# Patient Record
Sex: FEMALE | NOT HISPANIC OR LATINO | Employment: STUDENT | URBAN - METROPOLITAN AREA
[De-identification: names, ages, dates, MRNs, and addresses within clinical notes are randomized per-mention and may not be internally consistent; named-entity substitution may affect disease eponyms.]

---
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
## 2019-01-28 ENCOUNTER — OFFICE VISIT (OUTPATIENT)
Dept: URGENT CARE | Facility: CLINIC | Age: 19
End: 2019-01-28
Payer: COMMERCIAL

## 2019-01-28 VITALS
OXYGEN SATURATION: 99 % | RESPIRATION RATE: 16 BRPM | TEMPERATURE: 98 F | HEART RATE: 63 BPM | DIASTOLIC BLOOD PRESSURE: 82 MMHG | SYSTOLIC BLOOD PRESSURE: 122 MMHG | WEIGHT: 132.25 LBS | HEIGHT: 68 IN | BODY MASS INDEX: 20.04 KG/M2

## 2019-01-28 DIAGNOSIS — J01.00 ACUTE MAXILLARY SINUSITIS, RECURRENCE NOT SPECIFIED: Primary | ICD-10-CM

## 2019-01-28 PROCEDURE — 99499 NO LOS: ICD-10-PCS | Mod: S$GLB,,, | Performed by: NURSE PRACTITIONER

## 2019-01-28 PROCEDURE — 99499 UNLISTED E&M SERVICE: CPT | Mod: S$GLB,,, | Performed by: NURSE PRACTITIONER

## 2019-01-28 RX ORDER — GUAIFENESIN 600 MG/1
600 TABLET, EXTENDED RELEASE ORAL 2 TIMES DAILY
Qty: 60 TABLET | Refills: 0 | Status: SHIPPED | OUTPATIENT
Start: 2019-01-28 | End: 2019-02-27

## 2019-01-28 RX ORDER — LEVOCETIRIZINE DIHYDROCHLORIDE 5 MG/1
5 TABLET, FILM COATED ORAL DAILY
Qty: 30 TABLET | Refills: 0 | Status: ON HOLD | OUTPATIENT
Start: 2019-01-28 | End: 2019-05-01 | Stop reason: HOSPADM

## 2019-01-28 RX ORDER — FLUTICASONE PROPIONATE 50 MCG
2 SPRAY, SUSPENSION (ML) NASAL DAILY
Qty: 1 BOTTLE | Refills: 0 | Status: ON HOLD | OUTPATIENT
Start: 2019-01-28 | End: 2019-05-01 | Stop reason: HOSPADM

## 2019-01-28 RX ORDER — PROGESTERONE 100 MG/1
100 CAPSULE ORAL DAILY
Status: ON HOLD | COMMUNITY
End: 2019-05-01 | Stop reason: HOSPADM

## 2019-01-28 RX ORDER — PROMETHAZINE HYDROCHLORIDE AND DEXTROMETHORPHAN HYDROBROMIDE 6.25; 15 MG/5ML; MG/5ML
5 SYRUP ORAL NIGHTLY PRN
Qty: 180 ML | Refills: 0 | Status: SHIPPED | OUTPATIENT
Start: 2019-01-28 | End: 2019-02-02

## 2019-01-28 NOTE — PROGRESS NOTES
"Subjective:       Patient ID: Claudette Gilhespie is a 18 y.o. female.    Vitals:    01/28/19 1347   BP: 122/82   Pulse: 63   Resp: 16   Temp: 98 °F (36.7 °C)   TempSrc: Oral   SpO2: 99%   Weight: 60 kg (132 lb 4.4 oz)   Height: 5' 8" (1.727 m)       Chief Complaint: URI    Pt states she has had nasal and chest congestion with a productive cough that has worsened over the last 2 weeks.  Pt has taken aspirin and motrin. Pt states she has right ear pain.  Pt states she has been playing tennis outside for the last few days as well.      URI    This is a new problem. The current episode started 1 to 4 weeks ago. The problem has been gradually worsening. There has been no fever. The fever has been present for less than 1 day. Associated symptoms include congestion, coughing, ear pain, headaches, rhinorrhea, sinus pain and sneezing. Pertinent negatives include no abdominal pain, chest pain, nausea, sore throat or wheezing. She has tried NSAIDs (aspirin) for the symptoms. The treatment provided mild relief.     Review of Systems   Constitution: Negative for chills, fever and malaise/fatigue.   HENT: Positive for congestion, ear pain, rhinorrhea, sinus pain and sneezing. Negative for hoarse voice and sore throat.         Chest and nasal congestion   Eyes: Negative for discharge and redness.   Cardiovascular: Negative for chest pain, dyspnea on exertion and leg swelling.   Respiratory: Positive for cough. Negative for shortness of breath, sputum production and wheezing.    Musculoskeletal: Negative for myalgias.   Gastrointestinal: Negative for abdominal pain and nausea.   Neurological: Positive for headaches.       Objective:      Physical Exam   Constitutional: She is oriented to person, place, and time. She appears well-developed and well-nourished. She is cooperative.  Non-toxic appearance. She does not appear ill. No distress.   HENT:   Head: Normocephalic and atraumatic.   Right Ear: Hearing, tympanic membrane, " external ear and ear canal normal.   Left Ear: Hearing, tympanic membrane, external ear and ear canal normal.   Nose: Rhinorrhea and sinus tenderness present. No mucosal edema or nasal deformity. No epistaxis. Right sinus exhibits maxillary sinus tenderness. Right sinus exhibits no frontal sinus tenderness. Left sinus exhibits maxillary sinus tenderness and frontal sinus tenderness.   Mouth/Throat: Uvula is midline, oropharynx is clear and moist and mucous membranes are normal. No trismus in the jaw. Normal dentition. No uvula swelling. No posterior oropharyngeal erythema.   Eyes: Conjunctivae and lids are normal. Right eye exhibits no discharge. Left eye exhibits no discharge. No scleral icterus.   Sclera clear bilat   Neck: Trachea normal, normal range of motion, full passive range of motion without pain and phonation normal. Neck supple.   Cardiovascular: Normal rate, regular rhythm, normal heart sounds, intact distal pulses and normal pulses.   Pulmonary/Chest: Effort normal and breath sounds normal. No respiratory distress.   Abdominal: Soft. Normal appearance and bowel sounds are normal. She exhibits no distension, no pulsatile midline mass and no mass. There is no tenderness.   Musculoskeletal: Normal range of motion. She exhibits no edema or deformity.   Neurological: She is alert and oriented to person, place, and time. She exhibits normal muscle tone. Coordination normal.   Skin: Skin is warm, dry and intact. She is not diaphoretic. No pallor.   Psychiatric: She has a normal mood and affect. Her speech is normal and behavior is normal. Judgment and thought content normal. Cognition and memory are normal.   Nursing note and vitals reviewed.      Assessment:       1. Acute maxillary sinusitis, recurrence not specified        Plan:       Claudette was seen today for uri.    Diagnoses and all orders for this visit:    Acute maxillary sinusitis, recurrence not specified  -     promethazine-dextromethorphan  (PROMETHAZINE-DM) 6.25-15 mg/5 mL Syrp; Take 5 mLs by mouth nightly as needed.  -     fluticasone (FLONASE) 50 mcg/actuation nasal spray; 2 sprays (100 mcg total) by Each Nare route once daily.  -     guaiFENesin (MUCINEX) 600 mg 12 hr tablet; Take 1 tablet (600 mg total) by mouth 2 (two) times daily.  -     levocetirizine (XYZAL) 5 MG tablet; Take 1 tablet (5 mg total) by mouth once daily.    Please follow up with your Primary care provider within 2-5 days if your signs and symptoms have not resolved or worsen.     If your condition worsens or fails to improve we recommend that you receive another evaluation at the emergency room immediately or contact your primary medical clinic to discuss your concerns.   You must understand that you have received an Urgent Care treatment only and that you may be released before all of your medical problems are known or treated. You, the patient, will arrange for follow up care as instructed.     RED FLAGS/WARNING SYMPTOMS DISCUSSED WITH PATIENT THAT WOULD WARRANT EMERGENT MEDICAL ATTENTION. PATIENT VERBALIZED UNDERSTANDING.       Sinusitis (No Antibiotics)    The sinuses are air-filled spaces within the bones of the face. They connect to the inside of the nose. Sinusitis is an inflammation of the tissue lining the sinus cavity. Sinus inflammation can occur during a cold. It can also be due to allergies to pollens and other particles in the air. It can cause symptoms such as sinus congestion, headache, sore throat, facial swelling and fullness. It may also cause a low-grade fever. No infection is present, and no antibiotic treatment is needed.  Home care  · Drink plenty of water, hot tea, and other liquids. This may help thin mucus. It also may promote sinus drainage.  · Heat may help soothe painful areas of the face. Use a towel soaked in hot water. Or,  the shower and direct the hot spray onto your face. Using a vaporizer along with a menthol rub at night may also  help.   · An expectorant containing guaifenesin may help thin the mucus and promote drainage from the sinuses.  · Over-the-counter decongestants may be used unless a similar medicine was prescribed. Nasal sprays work the fastest. Use one that contains phenylephrine or oxymetazoline. First blow the nose gently. Then use the spray. Do not use these medicines more often than directed on the label or symptoms may get worse. You may also use tablets containing pseudoephedrine. Avoid products that combine ingredients, because side effects may be increased. Read labels. You can also ask the pharmacist for help. (NOTE: Persons with high blood pressure should not use decongestants. They can raise blood pressure.)  · Over-the-counter antihistamines may help if allergies contributed to your sinusitis.    · Use acetaminophen or ibuprofen to control pain, unless another pain medicine was prescribed. (If you have chronic liver or kidney disease or ever had a stomach ulcer, talk with your doctor before using these medicines. Aspirin should never be used in anyone under 18 years of age who is ill with a fever. It may cause severe liver damage.)  · Use nasal rinses or irrigation as instructed by your health care provider.  · Don't smoke. This can worsen symptoms.  Follow-up care  Follow up with your healthcare provider or our staff if you are not improving within the next week.  When to seek medical advice  Call your healthcare provider if any of these occur:  · Green or yellow discharge from the nose or into the throat  · Facial pain or headache becoming more severe  · Stiff neck  · Unusual drowsiness or confusion  · Swelling of the forehead or eyelids  · Vision problems, including blurred or double vision  · Fever of 100.4ºF (38ºC) or higher, or as directed by your healthcare provider  · Seizure  · Breathing problems  · Symptoms not resolving within 10 days  Date Last Reviewed: 4/13/2015  © 7843-3833 [employer] [address]  [place] [address]. All rights reserved. This information is not intended as a substitute for professional medical care. Always follow your healthcare professional's instructions.

## 2019-01-28 NOTE — PATIENT INSTRUCTIONS
Please follow up with your Primary care provider within 2-5 days if your signs and symptoms have not resolved or worsen.     If your condition worsens or fails to improve we recommend that you receive another evaluation at the emergency room immediately or contact your primary medical clinic to discuss your concerns.   You must understand that you have received an Urgent Care treatment only and that you may be released before all of your medical problems are known or treated. You, the patient, will arrange for follow up care as instructed.     RED FLAGS/WARNING SYMPTOMS DISCUSSED WITH PATIENT THAT WOULD WARRANT EMERGENT MEDICAL ATTENTION. PATIENT VERBALIZED UNDERSTANDING.       Sinusitis (No Antibiotics)    The sinuses are air-filled spaces within the bones of the face. They connect to the inside of the nose. Sinusitis is an inflammation of the tissue lining the sinus cavity. Sinus inflammation can occur during a cold. It can also be due to allergies to pollens and other particles in the air. It can cause symptoms such as sinus congestion, headache, sore throat, facial swelling and fullness. It may also cause a low-grade fever. No infection is present, and no antibiotic treatment is needed.  Home care  · Drink plenty of water, hot tea, and other liquids. This may help thin mucus. It also may promote sinus drainage.  · Heat may help soothe painful areas of the face. Use a towel soaked in hot water. Or,  the shower and direct the hot spray onto your face. Using a vaporizer along with a menthol rub at night may also help.   · An expectorant containing guaifenesin may help thin the mucus and promote drainage from the sinuses.  · Over-the-counter decongestants may be used unless a similar medicine was prescribed. Nasal sprays work the fastest. Use one that contains phenylephrine or oxymetazoline. First blow the nose gently. Then use the spray. Do not use these medicines more often than directed on the label or  symptoms may get worse. You may also use tablets containing pseudoephedrine. Avoid products that combine ingredients, because side effects may be increased. Read labels. You can also ask the pharmacist for help. (NOTE: Persons with high blood pressure should not use decongestants. They can raise blood pressure.)  · Over-the-counter antihistamines may help if allergies contributed to your sinusitis.    · Use acetaminophen or ibuprofen to control pain, unless another pain medicine was prescribed. (If you have chronic liver or kidney disease or ever had a stomach ulcer, talk with your doctor before using these medicines. Aspirin should never be used in anyone under 18 years of age who is ill with a fever. It may cause severe liver damage.)  · Use nasal rinses or irrigation as instructed by your health care provider.  · Don't smoke. This can worsen symptoms.  Follow-up care  Follow up with your healthcare provider or our staff if you are not improving within the next week.  When to seek medical advice  Call your healthcare provider if any of these occur:  · Green or yellow discharge from the nose or into the throat  · Facial pain or headache becoming more severe  · Stiff neck  · Unusual drowsiness or confusion  · Swelling of the forehead or eyelids  · Vision problems, including blurred or double vision  · Fever of 100.4ºF (38ºC) or higher, or as directed by your healthcare provider  · Seizure  · Breathing problems  · Symptoms not resolving within 10 days  Date Last Reviewed: 4/13/2015  © 3410-5431 [employer]. [address] [place] [address], [place] [address]. All rights reserved. This information is not intended as a substitute for professional medical care. Always follow your healthcare professional's instructions.

## 2019-02-14 ENCOUNTER — OFFICE VISIT (OUTPATIENT)
Dept: URGENT CARE | Facility: CLINIC | Age: 19
End: 2019-02-14

## 2019-02-14 VITALS
OXYGEN SATURATION: 100 % | HEIGHT: 68 IN | BODY MASS INDEX: 20 KG/M2 | DIASTOLIC BLOOD PRESSURE: 64 MMHG | WEIGHT: 132 LBS | TEMPERATURE: 99 F | RESPIRATION RATE: 16 BRPM | HEART RATE: 86 BPM | SYSTOLIC BLOOD PRESSURE: 124 MMHG

## 2019-02-14 DIAGNOSIS — J10.1 INFLUENZA A: Primary | ICD-10-CM

## 2019-02-14 LAB
CTP QC/QA: YES
FLUAV AG NPH QL: POSITIVE
FLUBV AG NPH QL: NEGATIVE

## 2019-02-14 PROCEDURE — 87804 POCT INFLUENZA A/B: ICD-10-PCS | Mod: QW,S$GLB,, | Performed by: FAMILY MEDICINE

## 2019-02-14 PROCEDURE — 99499 UNLISTED E&M SERVICE: CPT | Mod: S$GLB,,, | Performed by: FAMILY MEDICINE

## 2019-02-14 PROCEDURE — 99499 NO LOS: ICD-10-PCS | Mod: S$GLB,,, | Performed by: FAMILY MEDICINE

## 2019-02-14 PROCEDURE — 87804 INFLUENZA ASSAY W/OPTIC: CPT | Mod: QW,S$GLB,, | Performed by: FAMILY MEDICINE

## 2019-02-14 RX ORDER — OSELTAMIVIR PHOSPHATE 75 MG/1
75 CAPSULE ORAL 2 TIMES DAILY
Qty: 10 CAPSULE | Refills: 0 | Status: SHIPPED | OUTPATIENT
Start: 2019-02-14 | End: 2019-02-19

## 2019-02-14 RX ORDER — PREDNISONE 20 MG/1
20 TABLET ORAL DAILY
Qty: 3 TABLET | Refills: 0 | Status: SHIPPED | OUTPATIENT
Start: 2019-02-14 | End: 2019-02-17

## 2019-02-14 NOTE — PATIENT INSTRUCTIONS
If you were prescribed a narcotic or controlled medication, do not drive or operate heavy equipment or machinery while taking these medications.  You must understand that you've received an Urgent Care treatment only and that you may be released before all your medical problems are known or treated. You, the patient, will arrange for follow up care as instructed.  Follow up with your PCP or specialty clinic as directed in the next 1-2 weeks if not improved or as needed.  You can call [phone] to schedule an appointment with the appropriate provider.  If your condition worsens we recommend that you receive another evaluation at the emergency room immediately or contact your primary medical clinics after hours call service to discuss your concerns.  Please return here or go to the Emergency Department for any concerns or worsening of condition.    Influenza (Adult)    Influenza is also called the flu. It is a viral illness that affects the air passages of your lungs. It is different from the common cold. The flu can easily be passed from one to person to another. It may be spread through the air by coughing and sneezing. Or it can be spread by touching the sick person and then touching your own eyes, nose, or mouth.  The flu starts 1 to 3 days after you are exposed to the flu virus. It may last for 1 to 2 weeks but many people feel tired or fatigued for many weeks afterward. You usually dont need to take antibiotics unless you have a complication. This might be an ear or sinus infection or pneumonia.  Symptoms of the flu may be mild or severe. They can include extreme tiredness (wanting to stay in bed all day), chills, fevers, muscle aches, soreness with eye movement, headache, and a dry, hacking cough.  Home care  Follow these guidelines when caring for yourself at home:  · Avoid being around cigarette smoke, whether yours or other peoples.  · Acetaminophen or ibuprofen will help ease your fever, muscle  aches, and headache. Dont give aspirin to anyone younger than 18 who has the flu. Aspirin can harm the liver.  · Nausea and loss of appetite are common with the flu. Eat light meals. Drink 6 to 8 glasses of liquids every day. Good choices are water, sport drinks, soft drinks without caffeine, juices, tea, and soup. Extra fluids will also help loosen secretions in your nose and lungs.  · Over-the-counter cold medicines will not make the flu go away faster. But the medicines may help with coughing, sore throat, and congestion in your nose and sinuses. Dont use a decongestant if you have high blood pressure.  · Stay home until your fever has been gone for at least 24 hours without using medicine to reduce fever.  Follow-up care  Follow up with your healthcare provider, or as advised, if you are not getting better over the next week.  If you are age 65 or older, talk with your provider about getting a pneumococcal vaccine every 5 years. You should also get this vaccine if you have chronic asthma or COPD. All adults should get a flu vaccine every fall. Ask your provider about this.  When to seek medical advice  Call your healthcare provider right away if any of these occur:  · Cough with lots of colored mucus (sputum) or blood in your mucus  · Chest pain, shortness of breath, wheezing, or trouble breathing  · Severe headache, or face, neck, or ear pain  · New rash with fever  · Fever of 100.4°F (38°C) or higher, or as directed by your healthcare provider  · Confusion, behavior change, or seizure  · Severe weakness or dizziness  · You get a new fever or cough after getting better for a few days  Date Last Reviewed: 1/1/2017  © 2282-8933 [employer]. [address] [place] [address], [place] [address]. All rights reserved. This information is not intended as a substitute for professional medical care. Always follow your healthcare professional's instructions.

## 2019-02-14 NOTE — PROGRESS NOTES
"Subjective:       Patient ID: Claudette Gilhespie is a 18 y.o. female.    Vitals:    02/14/19 0942   BP: 124/64   Pulse: 86   Resp: 16   Temp: 98.9 °F (37.2 °C)   TempSrc: Oral   SpO2: 100%   Weight: 59.9 kg (132 lb)   Height: 5' 8" (1.727 m)       Chief Complaint: URI    Pt c/o having hot and cold sweats, body aches, headache, and a cough. Pt states she's had headaches everyday for a month.  Pt has been taking ibuprofen for the headaches.   Pt states she's had the cough since she came in 2 weeks ago, but did not fill any of the medications.  Pt states it worsened last night and the is when the sweats and body aches started.  Pt took ibuprofen about 45 minutes ago.      Review of Systems   Constitution: Positive for chills and night sweats. Negative for fever and malaise/fatigue.        Body aches   HENT: Negative for congestion, ear pain, hoarse voice and sore throat.    Eyes: Negative for discharge and redness.   Cardiovascular: Negative for chest pain, dyspnea on exertion and leg swelling.   Respiratory: Positive for cough. Negative for shortness of breath, sputum production and wheezing.    Musculoskeletal: Negative for myalgias.   Gastrointestinal: Positive for abdominal pain and nausea.   Neurological: Positive for headaches.       Objective:      Physical Exam   Constitutional: She is oriented to person, place, and time. She appears well-developed and well-nourished. She is cooperative.  Non-toxic appearance. She does not appear ill. No distress.   HENT:   Head: Normocephalic and atraumatic.       Right Ear: Hearing, tympanic membrane, external ear and ear canal normal.   Left Ear: Hearing, tympanic membrane, external ear and ear canal normal.   Nose: No mucosal edema, rhinorrhea or nasal deformity. No epistaxis. Right sinus exhibits maxillary sinus tenderness. Right sinus exhibits no frontal sinus tenderness. Left sinus exhibits maxillary sinus tenderness. Left sinus exhibits no frontal sinus tenderness. "   Mouth/Throat: Uvula is midline and mucous membranes are normal. No trismus in the jaw. Normal dentition. No uvula swelling. Posterior oropharyngeal edema (pnd and cobblestoning) present. No posterior oropharyngeal erythema.   Eyes: Conjunctivae and lids are normal. No scleral icterus.   Sclera clear bilat   Neck: Trachea normal, full passive range of motion without pain and phonation normal. Neck supple.   Cardiovascular: Normal rate, regular rhythm, normal heart sounds, intact distal pulses and normal pulses.   Pulmonary/Chest: Effort normal and breath sounds normal. No respiratory distress.   Abdominal: Soft. Normal appearance and bowel sounds are normal. She exhibits no distension. There is no tenderness.   Musculoskeletal: Normal range of motion. She exhibits no edema or deformity.   Neurological: She is alert and oriented to person, place, and time. She exhibits normal muscle tone. Coordination normal.   Skin: Skin is warm, dry and intact. She is not diaphoretic. No pallor.   Psychiatric: She has a normal mood and affect. Her speech is normal and behavior is normal. Judgment and thought content normal. Cognition and memory are normal.   Nursing note and vitals reviewed.      Assessment:       1. Influenza A        Plan:       Claudette was seen today for uri.    Diagnoses and all orders for this visit:    Influenza A  -     POCT Influenza A/B  -     oseltamivir (TAMIFLU) 75 MG capsule; Take 1 capsule (75 mg total) by mouth 2 (two) times daily. for 5 days  -     predniSONE (DELTASONE) 20 MG tablet; Take 1 tablet (20 mg total) by mouth once daily. for 3 days      Recent Results (from the past 48 hour(s))   POCT Influenza A/B    Collection Time: 02/14/19 10:15 AM   Result Value Ref Range    Rapid Influenza A Ag Positive (A) Negative    Rapid Influenza B Ag Negative Negative     Acceptable Yes    ]    Patient Instructions   If you were prescribed a narcotic or controlled medication, do not drive or  operate heavy equipment or machinery while taking these medications.  You must understand that you've received an Urgent Care treatment only and that you may be released before all your medical problems are known or treated. You, the patient, will arrange for follow up care as instructed.  Follow up with your PCP or specialty clinic as directed in the next 1-2 weeks if not improved or as needed.  You can call [phone] to schedule an appointment with the appropriate provider.  If your condition worsens we recommend that you receive another evaluation at the emergency room immediately or contact your primary medical clinics after hours call service to discuss your concerns.  Please return here or go to the Emergency Department for any concerns or worsening of condition.    Influenza (Adult)    Influenza is also called the flu. It is a viral illness that affects the air passages of your lungs. It is different from the common cold. The flu can easily be passed from one to person to another. It may be spread through the air by coughing and sneezing. Or it can be spread by touching the sick person and then touching your own eyes, nose, or mouth.  The flu starts 1 to 3 days after you are exposed to the flu virus. It may last for 1 to 2 weeks but many people feel tired or fatigued for many weeks afterward. You usually dont need to take antibiotics unless you have a complication. This might be an ear or sinus infection or pneumonia.  Symptoms of the flu may be mild or severe. They can include extreme tiredness (wanting to stay in bed all day), chills, fevers, muscle aches, soreness with eye movement, headache, and a dry, hacking cough.  Home care  Follow these guidelines when caring for yourself at home:  · Avoid being around cigarette smoke, whether yours or other peoples.  · Acetaminophen or ibuprofen will help ease your fever, muscle aches, and headache. Dont give aspirin to anyone younger than 18 who has the  flu. Aspirin can harm the liver.  · Nausea and loss of appetite are common with the flu. Eat light meals. Drink 6 to 8 glasses of liquids every day. Good choices are water, sport drinks, soft drinks without caffeine, juices, tea, and soup. Extra fluids will also help loosen secretions in your nose and lungs.  · Over-the-counter cold medicines will not make the flu go away faster. But the medicines may help with coughing, sore throat, and congestion in your nose and sinuses. Dont use a decongestant if you have high blood pressure.  · Stay home until your fever has been gone for at least 24 hours without using medicine to reduce fever.  Follow-up care  Follow up with your healthcare provider, or as advised, if you are not getting better over the next week.  If you are age 65 or older, talk with your provider about getting a pneumococcal vaccine every 5 years. You should also get this vaccine if you have chronic asthma or COPD. All adults should get a flu vaccine every fall. Ask your provider about this.  When to seek medical advice  Call your healthcare provider right away if any of these occur:  · Cough with lots of colored mucus (sputum) or blood in your mucus  · Chest pain, shortness of breath, wheezing, or trouble breathing  · Severe headache, or face, neck, or ear pain  · New rash with fever  · Fever of 100.4°F (38°C) or higher, or as directed by your healthcare provider  · Confusion, behavior change, or seizure  · Severe weakness or dizziness  · You get a new fever or cough after getting better for a few days  Date Last Reviewed: 1/1/2017  © 4093-2744 [employer]. [address] [place] [address], [place] [address]. All rights reserved. This information is not intended as a substitute for professional medical care. Always follow your healthcare professional's instructions.

## 2019-02-20 ENCOUNTER — TELEPHONE (OUTPATIENT)
Dept: URGENT CARE | Facility: CLINIC | Age: 19
End: 2019-02-20

## 2019-03-12 ENCOUNTER — OFFICE VISIT (OUTPATIENT)
Dept: CARDIOLOGY | Facility: CLINIC | Age: 19
End: 2019-03-12
Payer: COMMERCIAL

## 2019-03-12 ENCOUNTER — DOCUMENTATION ONLY (OUTPATIENT)
Dept: CARDIOLOGY | Facility: CLINIC | Age: 19
End: 2019-03-12

## 2019-03-12 VITALS
WEIGHT: 134.69 LBS | DIASTOLIC BLOOD PRESSURE: 75 MMHG | HEART RATE: 58 BPM | OXYGEN SATURATION: 98 % | SYSTOLIC BLOOD PRESSURE: 122 MMHG | BODY MASS INDEX: 20.41 KG/M2 | HEIGHT: 68 IN

## 2019-03-12 DIAGNOSIS — G43.109 MIGRAINE WITH AURA AND WITHOUT STATUS MIGRAINOSUS, NOT INTRACTABLE: ICD-10-CM

## 2019-03-12 DIAGNOSIS — G45.9 TIA (TRANSIENT ISCHEMIC ATTACK): ICD-10-CM

## 2019-03-12 DIAGNOSIS — Q21.12 PFO (PATENT FORAMEN OVALE): Primary | ICD-10-CM

## 2019-03-12 PROCEDURE — 99999 PR PBB SHADOW E&M-EST. PATIENT-LVL III: ICD-10-PCS | Mod: PBBFAC,,, | Performed by: INTERNAL MEDICINE

## 2019-03-12 PROCEDURE — 99215 OFFICE O/P EST HI 40 MIN: CPT | Mod: S$GLB,,, | Performed by: INTERNAL MEDICINE

## 2019-03-12 PROCEDURE — 99215 PR OFFICE/OUTPT VISIT, EST, LEVL V, 40-54 MIN: ICD-10-PCS | Mod: S$GLB,,, | Performed by: INTERNAL MEDICINE

## 2019-03-12 PROCEDURE — 99999 PR PBB SHADOW E&M-EST. PATIENT-LVL III: CPT | Mod: PBBFAC,,, | Performed by: INTERNAL MEDICINE

## 2019-03-12 RX ORDER — CLOPIDOGREL BISULFATE 75 MG/1
75 TABLET ORAL DAILY
Qty: 30 TABLET | Refills: 6 | Status: SHIPPED | OUTPATIENT
Start: 2019-03-12 | End: 2019-08-12 | Stop reason: SDUPTHER

## 2019-03-12 RX ORDER — SODIUM CHLORIDE 0.9 % (FLUSH) 0.9 %
5 SYRINGE (ML) INJECTION
Status: CANCELLED | OUTPATIENT
Start: 2019-03-12

## 2019-03-12 RX ORDER — SODIUM CHLORIDE 9 MG/ML
INJECTION, SOLUTION INTRAVENOUS ONCE
Status: CANCELLED | OUTPATIENT
Start: 2019-03-12 | End: 2019-03-12

## 2019-03-12 RX ORDER — ASPIRIN 81 MG/1
81 TABLET ORAL DAILY
Refills: 0 | COMMUNITY
Start: 2019-03-12 | End: 2020-03-11

## 2019-03-12 RX ORDER — ASPIRIN 325 MG
TABLET ORAL
Refills: 0 | COMMUNITY
Start: 2019-03-05 | End: 2019-03-12

## 2019-03-12 RX ORDER — SIMVASTATIN 20 MG/1
TABLET, FILM COATED ORAL
Refills: 0 | Status: ON HOLD | COMMUNITY
Start: 2019-03-05 | End: 2019-05-01 | Stop reason: HOSPADM

## 2019-03-12 NOTE — PROGRESS NOTES
You have been scheduled for a procedure in the echo lab on Wednesday March 27, 2019.     Please report to the Cardiology Waiting Area on the Third floor of the hospital and check in at 12 PM.     You will then be taken to the SSCU (Short Stay Cardiac Unit) and prepared for your procedure. Please be aware that this is not the time of your procedure but the time you are to arrive. The procedures are scheduled on an hourly basis; however, emergency cases take precedence over all other cases.     You may not have anything to eat or drink after midnight the night before your test. You may take your regular morning medications with water.    The procedure will take 1-2 hours to perform. After the procedure, you will return to SSCU on the third floor of the hospital. Your family may remain in the room with you during this time.     You will be discharged home that same afternoon. You must have someone to drive you home.  Your doctor will determine, based on your progress, the time of your discharge. The results of your procedure will be discussed with you before you are discharged. Any further testing or procedures will be scheduled for you either before you leave or you will be called with these appointments.     You will be contacted by the echo department prior to your procedure for a  pre-procedure questionnaire.    If you should have any questions, concerns, or need to change the date of your procedure, please call ATUL Baker @ [phone]    Special Instructions:    None        THE ABOVE INSTRUCTIONS WERE GIVEN TO THE PATIENT VERBALLY AND THEY VERBALIZED UNDERSTANDING.  THEY DO NOT REQUIRE ANY SPECIAL NEEDS AND DO NOT HAVE ANY LEARNING BARRIERS.          Directions for Reporting to Cardiology Waiting Area in the Hospital  If you park in the Parking Garage:  Take elevators to the1st floor of the parking garage.  Continue past the gift shop, coffee shop, and piano.  Take a right and go to the gold elevators.  (Elevator B)  Take the elevator to the 3rd floor.  Follow the arrow on the sign on the wall that says Cath Lab Registration/EP Lab Registration.  Follow the long hallway all the way around until you come to a big open area.  This is the registration area.  Check in at Reception Desk.    OR    If family is dropping you off:  Have them drop you off at the front of the Hospital under the green overhang.  Enter through the doors and take a right.  Take the E elevators to the 3rd floor Cardiology Waiting Area.  Check in at the Reception Desk in the waiting room.

## 2019-03-12 NOTE — PATIENT INSTRUCTIONS
Medications:  -Switch to Aspirin 81 mg once daily from 325 mg  -Start Plavix (Clopidogrel) 75 mg once daily

## 2019-03-12 NOTE — PROGRESS NOTES
Interventional Cardiology Clinic Note  Reason for Visit: Systolic Murmur  Referred by:  Dr Monica Martel  We appreciate your kind referral    HPI:   Ms. Claudette Gilhespie is a 18 y.o. woman with PMH sig for migraine headaches, PFO presenting for follow up.    She was previously seen in clinic in August 2018 for a heart murmur. TTE without significant valvular dysfunction, incidentally found small PFO with L to R shunt. Asymptomatic, very active as a college  and doing well at that time, no further intervention recommended at that time.    She presents again now after a TIA last month. While watching a tennis match, she began having R sided weakness lasting about 4 hrs ago as well as word-finding difficulty and slurred speech for ~2 hrs. She was seen in the ED, diagnosed with a TIA. She is feeling back to normal with exception of feeling slightly weaker in the right arm and feels like she's having similar symptoms to post-concussion- delayed recall, mild headaches. Otherwise, doing well. She was started on  and crestor 10. Her estrogen based OCP was discontinued and she was advised to never take OCPs again. Hypercoagauable workup ordered, will need to obtain records. Denies hx of clots. 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 Also flies 8 hr flight from [place], last in January without prior issues.    ROS:    Review of Systems   Constitution: Negative.   HENT: Negative.    Eyes: Negative.    Cardiovascular: Negative.    Respiratory: Negative.    Endocrine: Negative.    Skin: Negative.    Musculoskeletal: Negative.    Gastrointestinal: Negative.    Genitourinary: Negative.    Neurological: Negative.      PMH:     Past Medical History:   Diagnosis Date    Migraine with aura and without status migrainosus, not intractable 9/21/2017    Had to take [place]iz[place] for [place] in the  [place] 6 [place] Normally gets a headache after tennis matches Medrol dose pack helpful    PFO (patent foramen ovale) 3/12/2019    TIA (transient ischemic attack) 3/12/2019     History reviewed. No pertinent surgical history.  Allergies:   Review of patient's allergies indicates:  No Known Allergies  Medications:     Current Outpatient Medications on File Prior to Visit   Medication Sig Dispense Refill    simvastatin (ZOCOR) 20 MG tablet TK 1 T PO HS  0    [DISCONTINUED] aspirin 325 MG tablet TK 1 T PO D  0     No current facility-administered medications on file prior to visit.      Social History:     Social History     Tobacco Use    Smoking status: Never Smoker    Smokeless tobacco: Never Used   Substance Use Topics    Alcohol use: No     Family History:   History reviewed. No pertinent family history.  Physical Exam:   /75 (BP Location: Right arm, Patient Position: Sitting, BP Method: Small (Automatic))   Pulse (!) 58   Ht 5' 8" (1.727 m)   Wt 61.1 kg (134 lb 11.2 oz)   SpO2 98%   BMI 20.48 kg/m²    Physical Exam   Constitutional: She is oriented to person, place, and time. She appears well-developed and well-nourished.   HENT:   Head: Normocephalic and atraumatic.   Eyes: Conjunctivae and EOM are normal. Pupils are equal, round, and reactive to light.   Neck: Normal range of motion. Neck supple. No JVD present.   Cardiovascular: Normal rate and regular rhythm. Exam reveals no gallop and no friction rub.   No murmur heard.  Pulmonary/Chest: Effort normal and breath sounds normal. No respiratory distress. She has no wheezes. She has no rales. She exhibits no tenderness.   Abdominal: Soft. Bowel sounds are normal. She exhibits no distension. There is no tenderness.   Musculoskeletal: Normal range of motion. She exhibits no edema or tenderness.   Neurological: She is alert and oriented to person, place, and time.   Skin: Skin is warm and dry. No erythema. No pallor.   Psychiatric: She has a normal " mood and affect.       Labs:     No results found for: NA, K, CL, CO2, BUN, CREATININE, GLUCOSE, ANIONGAP  No results found for: HGBA1C  No results found for: BNP, BNPTRIAGEBLO No results found for: WBC, HGB, HCT, PLT, GRAN  No results found for: CHOL, HDL, LDLCALC, TRIG       Imaging:   EKG: nsr with sinus arrhythmia, LVH (sokolov-shah), rightward axis  TTE:  CONCLUSIONS     1 - Normal left ventricular systolic function (EF 60-65%).     2 - Normal left ventricular diastolic function.     3 - Normal right ventricular systolic function .     4 - The estimated PA systolic pressure is 25 mmHg.     5 - Trivial pulmonic regurgitation.     6 - Intermediate central venous pressure.     7 - No wall motion abnormalities.     Assessment:     1. PFO (patent foramen ovale)    2. TIA (transient ischemic attack)    3. Migraine with aura and without status migrainosus, not intractable    18F with hx of migraines here for re-evaluation of PFO. Now symptomatic with recent TIA.   Recommend PFO closure.   Will need outpatient monitor to evaluate for subclinical AF, and hypercoaguable w/u.   DVT U/S negative  No OCPs in the future.    Prior Workup:  -DVT U/S negative  -Event monitor pending  -Hypercoaguability pending (may need additional labs)    Plan:   -LEXI  -48 hr Holter  -Switch  to 81 daily  -Start plavix 75 daily  -PFO closure to be scheduled after LEXI  - 6 months DAPT after closure, then TTE with bubble  -Okay to return to competitive activity without limitation. After PFO closure, will stay overnight. Can resume competitive activity after 2-3 days (limited by groin venous access, not by PFO closure).     Discussed with Dr. Wilson    Signed:  Ko Barillas MD  3/12/2019 8:52 AM  I have personally taken the history and examined this patient. I have discussed and agree with the resident's findings and plan as documented in the resident's note.  Chip Wilson

## 2019-03-15 ENCOUNTER — CLINICAL SUPPORT (OUTPATIENT)
Dept: CARDIOLOGY | Facility: HOSPITAL | Age: 19
End: 2019-03-15
Attending: STUDENT IN AN ORGANIZED HEALTH CARE EDUCATION/TRAINING PROGRAM
Payer: COMMERCIAL

## 2019-03-15 DIAGNOSIS — Q21.12 PFO (PATENT FORAMEN OVALE): ICD-10-CM

## 2019-03-15 PROCEDURE — 93227 HOLTER MONITOR - 48 HOUR (CUPID ONLY): ICD-10-PCS | Mod: ,,, | Performed by: INTERNAL MEDICINE

## 2019-03-15 PROCEDURE — 93227 XTRNL ECG REC<48 HR R&I: CPT | Mod: ,,, | Performed by: INTERNAL MEDICINE

## 2019-03-15 PROCEDURE — 93225 XTRNL ECG REC<48 HRS REC: CPT

## 2019-03-18 ENCOUNTER — ANESTHESIA EVENT (OUTPATIENT)
Dept: MEDSURG UNIT | Facility: HOSPITAL | Age: 19
End: 2019-03-18
Payer: COMMERCIAL

## 2019-03-19 ENCOUNTER — ANESTHESIA (OUTPATIENT)
Dept: MEDSURG UNIT | Facility: HOSPITAL | Age: 19
End: 2019-03-19
Payer: COMMERCIAL

## 2019-03-27 ENCOUNTER — HOSPITAL ENCOUNTER (OUTPATIENT)
Dept: CARDIOLOGY | Facility: CLINIC | Age: 19
Discharge: HOME | End: 2019-03-27
Attending: STUDENT IN AN ORGANIZED HEALTH CARE EDUCATION/TRAINING PROGRAM
Payer: COMMERCIAL

## 2019-03-27 ENCOUNTER — HOSPITAL ENCOUNTER (OUTPATIENT)
Facility: HOSPITAL | Age: 19
Discharge: HOME | End: 2019-03-27
Attending: INTERNAL MEDICINE | Admitting: INTERNAL MEDICINE
Payer: COMMERCIAL

## 2019-03-27 VITALS
HEART RATE: 57 BPM | RESPIRATION RATE: 18 BRPM | BODY MASS INDEX: 20.04 KG/M2 | OXYGEN SATURATION: 100 % | WEIGHT: 132.25 LBS | HEIGHT: 68 IN | SYSTOLIC BLOOD PRESSURE: 121 MMHG | DIASTOLIC BLOOD PRESSURE: 59 MMHG | TEMPERATURE: 97 F

## 2019-03-27 DIAGNOSIS — Q21.12 PFO (PATENT FORAMEN OVALE): ICD-10-CM

## 2019-03-27 DIAGNOSIS — Q21.12 PFO (PATENT FORAMEN OVALE): Primary | ICD-10-CM

## 2019-03-27 LAB
B-HCG UR QL: NEGATIVE
BSA FOR ECHO PROCEDURE: 1.7 M2
CTP QC/QA: YES
PROX AORTA: 2.1 CM
SINUS: 3 CM
STJ: 2.4 CM

## 2019-03-27 PROCEDURE — 93320 DOPPLER ECHO COMPLETE: CPT | Mod: 26,S$GLB,, | Performed by: INTERNAL MEDICINE

## 2019-03-27 PROCEDURE — 93320 TRANSESOPHAGEAL ECHO (TEE) (CUPID ONLY): ICD-10-PCS | Mod: 26,S$GLB,, | Performed by: INTERNAL MEDICINE

## 2019-03-27 PROCEDURE — D9220A PRA ANESTHESIA: ICD-10-PCS | Mod: ANES,,, | Performed by: ANESTHESIOLOGY

## 2019-03-27 PROCEDURE — 93325 TRANSESOPHAGEAL ECHO (TEE) (CUPID ONLY): ICD-10-PCS | Mod: 26,S$GLB,, | Performed by: INTERNAL MEDICINE

## 2019-03-27 PROCEDURE — 93320 DOPPLER ECHO COMPLETE: CPT

## 2019-03-27 PROCEDURE — 63600175 PHARM REV CODE 636 W HCPCS: Performed by: NURSE ANESTHETIST, CERTIFIED REGISTERED

## 2019-03-27 PROCEDURE — D9220A PRA ANESTHESIA: Mod: ANES,,, | Performed by: ANESTHESIOLOGY

## 2019-03-27 PROCEDURE — 37000008 HC ANESTHESIA 1ST 15 MINUTES

## 2019-03-27 PROCEDURE — 93312 ECHO TRANSESOPHAGEAL: CPT | Mod: 26,S$GLB,, | Performed by: INTERNAL MEDICINE

## 2019-03-27 PROCEDURE — 93312 ECHO TRANSESOPHAGEAL: CPT

## 2019-03-27 PROCEDURE — 93325 DOPPLER ECHO COLOR FLOW MAPG: CPT

## 2019-03-27 PROCEDURE — 93312 ECHO TRANSESOPHAGEAL: CPT | Performed by: INTERNAL MEDICINE

## 2019-03-27 PROCEDURE — D9220A PRA ANESTHESIA: Mod: CRNA,,, | Performed by: NURSE ANESTHETIST, CERTIFIED REGISTERED

## 2019-03-27 PROCEDURE — 93325 DOPPLER ECHO COLOR FLOW MAPG: CPT | Performed by: INTERNAL MEDICINE

## 2019-03-27 PROCEDURE — 25000003 PHARM REV CODE 250: Performed by: INTERNAL MEDICINE

## 2019-03-27 PROCEDURE — 81025 URINE PREGNANCY TEST: CPT | Performed by: INTERNAL MEDICINE

## 2019-03-27 PROCEDURE — 93325 DOPPLER ECHO COLOR FLOW MAPG: CPT | Mod: 26,S$GLB,, | Performed by: INTERNAL MEDICINE

## 2019-03-27 PROCEDURE — 37000009 HC ANESTHESIA EA ADD 15 MINS

## 2019-03-27 PROCEDURE — D9220A PRA ANESTHESIA: ICD-10-PCS | Mod: CRNA,,, | Performed by: NURSE ANESTHETIST, CERTIFIED REGISTERED

## 2019-03-27 PROCEDURE — 93320 DOPPLER ECHO COMPLETE: CPT | Performed by: INTERNAL MEDICINE

## 2019-03-27 PROCEDURE — 93312 TRANSESOPHAGEAL ECHO (TEE) (CUPID ONLY): ICD-10-PCS | Mod: 26,S$GLB,, | Performed by: INTERNAL MEDICINE

## 2019-03-27 RX ORDER — SODIUM CHLORIDE 9 MG/ML
INJECTION, SOLUTION INTRAVENOUS ONCE
Status: COMPLETED | OUTPATIENT
Start: 2019-03-27 | End: 2019-03-27

## 2019-03-27 RX ORDER — LIDOCAINE HCL/PF 100 MG/5ML
SYRINGE (ML) INTRAVENOUS
Status: DISCONTINUED | OUTPATIENT
Start: 2019-03-27 | End: 2019-03-27

## 2019-03-27 RX ORDER — FENTANYL CITRATE 50 UG/ML
INJECTION, SOLUTION INTRAMUSCULAR; INTRAVENOUS
Status: DISCONTINUED | OUTPATIENT
Start: 2019-03-27 | End: 2019-03-27

## 2019-03-27 RX ORDER — SODIUM CHLORIDE 0.9 % (FLUSH) 0.9 %
3 SYRINGE (ML) INJECTION
Status: DISCONTINUED | OUTPATIENT
Start: 2019-03-27 | End: 2019-03-27 | Stop reason: HOSPADM

## 2019-03-27 RX ORDER — PROPOFOL 10 MG/ML
VIAL (ML) INTRAVENOUS CONTINUOUS PRN
Status: DISCONTINUED | OUTPATIENT
Start: 2019-03-27 | End: 2019-03-27

## 2019-03-27 RX ORDER — PROPOFOL 10 MG/ML
VIAL (ML) INTRAVENOUS
Status: DISCONTINUED | OUTPATIENT
Start: 2019-03-27 | End: 2019-03-27

## 2019-03-27 RX ADMIN — SODIUM CHLORIDE: 0.9 INJECTION, SOLUTION INTRAVENOUS at 01:03

## 2019-03-27 RX ADMIN — PROPOFOL 50 MG: 10 INJECTION, EMULSION INTRAVENOUS at 01:03

## 2019-03-27 RX ADMIN — LIDOCAINE HYDROCHLORIDE 75 MG: 20 INJECTION, SOLUTION INTRAVENOUS at 01:03

## 2019-03-27 RX ADMIN — PROPOFOL 50 MG: 10 INJECTION, EMULSION INTRAVENOUS at 02:03

## 2019-03-27 RX ADMIN — FENTANYL CITRATE 50 MCG: 50 INJECTION, SOLUTION INTRAMUSCULAR; INTRAVENOUS at 02:03

## 2019-03-27 RX ADMIN — PROPOFOL 200 MCG/KG/MIN: 10 INJECTION, EMULSION INTRAVENOUS at 02:03

## 2019-03-27 RX ADMIN — PROPOFOL 150 MCG/KG/MIN: 10 INJECTION, EMULSION INTRAVENOUS at 02:03

## 2019-03-27 RX ADMIN — FENTANYL CITRATE 50 MCG: 50 INJECTION, SOLUTION INTRAMUSCULAR; INTRAVENOUS at 01:03

## 2019-03-27 NOTE — ANESTHESIA PREPROCEDURE EVALUATION
03/27/2019  Claudette Gilhespie is a 19 y.o., female.  Past Medical History:   Diagnosis Date    Migraine with aura and without status migrainosus, not intractable 9/21/2017    Had to take pizot[place] for [place] the [place] 6 [place] Normally gets a headache after tennis matches Medrol dose pack helpful    PFO (patent foramen ovale) 3/12/2019    TIA (transient ischemic attack) 3/12/2019     No past surgical history on file.  Patient Active Problem List   Diagnosis    Concussion without loss of consciousness    Acute post-traumatic headache    Insomnia due to medical condition    Migraine with aura and without status migrainosus, not intractable    Heart murmur    PFO (patent foramen ovale)    TIA (transient ischemic attack)     There is no height or weight on file to calculate BMI.  2D Echo:  Results for orders placed or performed during the hospital encounter of 08/28/18   Echo doppler color flow   Result Value Ref Range    QEF 60 55 - 65    Diastolic Dysfunction No     Est. PA Systolic Pressure 24.65        Please See ROS/PMH and Active Problem List above    Anesthesia Evaluation    I have reviewed the Patient Summary Reports.     I have reviewed the Medications.     Review of Systems  Anesthesia Hx:  Denies Hx of Anesthetic complications  Neg history of prior surgery. Denies Family Hx of Anesthesia complications.   Denies Personal Hx of Anesthesia complications.   Hematology/Oncology:  Hematology Normal   Oncology Normal     EENT/Dental:EENT/Dental Normal   Cardiovascular:  Cardiovascular Normal     Pulmonary:  Pulmonary Normal    Renal/:  Renal/ Normal     Hepatic/GI:  Hepatic/GI Normal    Neurological:   TIA, Headaches    Endocrine:  Endocrine Normal    Dermatological:  Skin Normal    Psych:  Psychiatric Normal           Physical Exam  General:  Well nourished    Airway/Jaw/Neck:  Airway  Findings: Mouth Opening: Normal Tongue: Normal  Mallampati: II  Improves to I with phonation.  TM Distance: Normal, at least 6 cm      Dental:  Dental Findings: In tact   Chest/Lungs:  Chest/Lungs Findings: Clear to auscultation     Heart/Vascular:  Heart Findings: Rate: Normal  Rhythm: Regular Rhythm  Heart Murmur     Abdomen:  Abdomen Findings:  Normal, Nontender, Soft       Mental Status:  Mental Status Findings:  Cooperative, Alert and Oriented         Anesthesia Plan  Type of Anesthesia, risks & benefits discussed:  Anesthesia Type:  general  Patient's Preference:   Intra-op Monitoring Plan: standard ASA monitors  Intra-op Monitoring Plan Comments:   Post Op Pain Control Plan: multimodal analgesia  Post Op Pain Control Plan Comments:   Induction:   IV  Beta Blocker:  Patient is not currently on a Beta-Blocker (No further documentation required).       Informed Consent: Patient understands risks and agrees with Anesthesia plan.  Questions answered. Anesthesia consent signed with patient.  ASA Score: 2     Day of Surgery Review of History & Physical:    H&P update referred to the surgeon.         Ready For Surgery From Anesthesia Perspective.

## 2019-03-27 NOTE — HOSPITAL COURSE
Patient presented for outpatient LEXI . Patient tolerated the procedure well. Please see LEXI report for details. Patient discharged home in a stable condition

## 2019-03-27 NOTE — PLAN OF CARE
Problem: Adult Inpatient Plan of Care  Goal: Plan of Care Review  Outcome: Ongoing (interventions implemented as appropriate)  Received report from ATUL Aguilera. Patient s/p LEXI, AAOx4. VSS, no c/o pain or discomfort at this time, resp even and unlabored. Post procedure protocol reviewed with patient and patient's friend. Understanding verbalized. Friend at bedside.  Nurse call bell within reach. Will continue to monitor per post procedure protocol.

## 2019-03-27 NOTE — ASSESSMENT & PLAN NOTE
PLAN:  1. LEXI for evaluation of PFO    -The risks, benefits & alternatives of the procedure were explained to the patient.    -The risks of transesophageal echo include but are not limited to:  Dental trauma, esophageal trauma/perforation, bleeding, laryngospasm/brochospasm, aspiration, sore throat/hoarseness, & dislodgement of the endotracheal tube/nasogastric tube (where applicable).    -The risks of moderate sedation include hypotension, respiratory depression, arrhythmias, bronchospasm, & death.    -Informed consent was obtained & the patient is agreeable to proceed with the procedure.    I will discuss with the attending physician. Attending addendum is to follow.     Further recommendations per attending addendum

## 2019-03-27 NOTE — DISCHARGE SUMMARY
[facility]  Cardiology  Discharge Summary      Patient Name: Claudette Gilhespie  MRN: [id number]  Admission Date: 3/27/2019  Hospital Length of Stay: 0 days  Discharge Date and Time:  03/27/2019 2:37 PM  Attending Physician: Tee Conteh MD    Discharging Provider: Manolo Berry MD  Primary Care Physician: Baljeet Horvath MD    HPI:   Claudette Gilhespie is a 19 y.o. woman with PMHx of h/o migraine headaches, TIA presents for evaluation for PFO.    Dysphagia or odynophagia:  No  Liver Disease, esophageal disease, or known varices:  No  Upper GI Bleeding: No  Snoring:  No  Sleep Apnea:  No  Prior neck surgery or radiation:  No  History of anesthetic difficulties:  No  Family history of anesthetic difficulties:  No  Last oral intake:  12 hours ago  Able to move neck in all directions:  Yes  Anticoagulation/Antiplatelets:plavix and aspirin      Procedure(s) (LRB):  ECHOCARDIOGRAM,TRANSESOPHAGEAL (N/A)     Indwelling Lines/Drains at time of discharge:  Lines/Drains/Airways          None          Hospital Course:  Patient presented for outpatient LEXI . Patient tolerated the procedure well. Please see LEXI report for details. Patient discharged home in a stable condition    Consults: none    Significant Diagnostic Studies: Labs: BMP: No results for input(s): GLU, NA, K, CL, CO2, BUN, CREATININE, CALCIUM, MG in the last 48 hours., CMP No results for input(s): NA, K, CL, CO2, GLU, BUN, CREATININE, CALCIUM, PROT, ALBUMIN, BILITOT, ALKPHOS, AST, ALT, ANIONGAP, ESTGFRAFRICA, EGFRNONAA in the last 48 hours. and CBC No results for input(s): WBC, HGB, HCT, PLT in the last 48 hours.    Pending Diagnostic Studies:     Procedure Component Value Units Date/Time    Transesophageal echo (LEXI) [[id number]]     Order Status:  Sent Lab Status:  No result           Final Active Diagnoses:    Diagnosis Date Noted POA    PFO (patent foramen ovale) [Q21.1] 03/12/2019 Not Applicable      Problems Resolved During  this Admission:         Discharged Condition: good    Disposition: home    Follow Up:with dr marr     Patient Instructions:   No discharge procedures on file.  Medications:  Reconciled Home Medications:      Medication List      CONTINUE taking these medications    aspirin 81 MG EC tablet  Commonly known as:  ECOTRIN  Take 1 tablet (81 mg total) by mouth once daily.     clopidogrel 75 mg tablet  Commonly known as:  PLAVIX  Take 1 tablet (75 mg total) by mouth once daily.     simvastatin 20 MG tablet  Commonly known as:  ZOCOR  TK 1 T PO HS            Time spent on the discharge of patient: 35 minutes    Manolo Berry MD  Cardiology  [facility]

## 2019-03-27 NOTE — NURSING TRANSFER
Nursing Transfer Note      3/27/2019     Transfer To: sscu    Transfer via stretcher    Transfer with na    Transported by pct    Medicines sent: na    Chart send with patient: Yes    Notified:  on 3rd floor    Patient reassessed at: 1515 3/27/19    Upon arrival to floor: call bell in reach    Report called to sscu nurse.

## 2019-03-27 NOTE — TRANSFER OF CARE
"Anesthesia Transfer of Care Note    Patient: Claudette Gilhespie    Procedure(s) Performed: Procedure(s) (LRB):  ECHOCARDIOGRAM,TRANSESOPHAGEAL (N/A)    Patient location: PACU    Anesthesia Type: general    Transport from OR: Transported from OR on room air with adequate spontaneous ventilation    Post pain: adequate analgesia    Post assessment: no apparent anesthetic complications and tolerated procedure well    Post vital signs: stable    Level of consciousness: sedated and responds to stimulation    Nausea/Vomiting: no nausea/vomiting    Complications: none    Transfer of care protocol was followed      Last vitals:   Visit Vitals  BP (!) 93/51   Pulse (!) 55   Temp 36.5 °C (97.7 °F) (Temporal)   Resp 18   Ht 5' 8" (1.727 m)   Wt 60 kg (132 lb 4.4 oz)   SpO2 99%   Breastfeeding? No   BMI 20.11 kg/m²     "

## 2019-03-27 NOTE — H&P
[facility]  Cardiology  History and Physical     Patient Name: Claudette Gilhespie  MRN: [id number]  Admission Date: 3/27/2019  Code Status: Full Code   Attending Provider: Tee Conteh MD   Primary Care Physician: Baljeet Horvath MD  Principal Problem:<principal problem not specified>    Patient information was obtained from patient and past medical records.     Subjective:     Chief Complaint:  TIA     HPI:  Claudette Gilhespie is a 19 y.o. woman with PMHx of h/o migraine headaches, TIA presents for evaluation for PFO.    Dysphagia or odynophagia:  No  Liver Disease, esophageal disease, or known varices:  No  Upper GI Bleeding: No  Snoring:  No  Sleep Apnea:  No  Prior neck surgery or radiation:  No  History of anesthetic difficulties:  No  Family history of anesthetic difficulties:  No  Last oral intake:  12 hours ago  Able to move neck in all directions:  Yes  Anticoagulation/Antiplatelets:plavix and aspirin      Past Medical History:   Diagnosis Date    Migraine with aura and without status migrainosus, not intractable 9/21/2017    Had to take pizotifen for [place]raines in the [place] for 6 months Normally gets a headache after tennis matches Medrol dose pack helpful    PFO (patent foramen ovale) 3/12/2019    TIA (transient ischemic attack) 3/12/2019       History reviewed. No pertinent surgical history.    Review of patient's allergies indicates:  No Known Allergies    No current facility-administered medications on file prior to encounter.      Current Outpatient Medications on File Prior to Encounter   Medication Sig    aspirin (ECOTRIN) 81 MG EC tablet Take 1 tablet (81 mg total) by mouth once daily.    clopidogrel (PLAVIX) 75 mg tablet Take 1 tablet (75 mg total) by mouth once daily.    simvastatin (ZOCOR) 20 MG tablet TK 1 T PO HS     Family History     None        Tobacco Use    Smoking status: Never Smoker    Smokeless tobacco: Never Used   Substance and Sexual Activity     Alcohol use: No    Drug use: No    Sexual activity: Not on file     Review of Systems   All other systems reviewed and are negative.    Objective:     Vital Signs (Most Recent):  Temp: 98 °F (36.7 °C) (03/27/19 1315)  Pulse: (!) 59 (03/27/19 1315)  Resp: 20 (03/27/19 1315)  BP: (!) 114/56 (03/27/19 1328)  SpO2: 100 % (03/27/19 1315) Vital Signs (24h Range):  Temp:  [98 °F (36.7 °C)] 98 °F (36.7 °C)  Pulse:  [59] 59  Resp:  [20] 20  SpO2:  [100 %] 100 %  BP: (105-114)/(56) 114/56     Weight: 60 kg (132 lb 4.4 oz)  Body mass index is 20.11 kg/m².    SpO2: 100 %  O2 Device (Oxygen Therapy): room air    No intake or output data in the 24 hours ending 03/27/19 1350    Lines/Drains/Airways     Peripheral Intravenous Line                 Peripheral IV - Single Lumen 03/27/19 1327 Left Antecubital less than 1 day                Physical Exam  General: alert, awake and oriented x 3  Eyes:PERRL.   Neck:no JVD   Lungs:  clear to auscultation bilaterally   Cardiovascular: Heart: regular rate and rhythm, S1, S2 normal, no murmur, click, rub or gallop.   Chest Wall: no tenderness.   Pulses-2+ radial, femoral, DP, PT b/l  Extremities: no cyanosis or edema.   Abdomen/Rectal: Abdomen: soft, non-tender non-distented; bowel sounds normal  Neurologic: Normal strength and tone. No focal numbness or weakness  Significant Labs: CMP No results for input(s): NA, K, CL, CO2, GLU, BUN, CREATININE, CALCIUM, PROT, ALBUMIN, BILITOT, ALKPHOS, AST, ALT, ANIONGAP, ESTGFRAFRICA, EGFRNONAA in the last 48 hours., CBC No results for input(s): WBC, HGB, HCT, PLT in the last 48 hours. and INR No results for input(s): INR, PROTIME in the last 48 hours.    Significant Imaging: Echocardiogram:   2D echo with color flow doppler:   Results for orders placed or performed during the hospital encounter of 08/28/18   Echo doppler color flow   Result Value Ref Range    QEF 60 55 - 65    Diastolic Dysfunction No     Est. PA Systolic Pressure 24.65     and  Transthoracic echo (TTE) complete (Cupid Only): No results found for this or any previous visit.    Assessment and Plan:     PFO (patent foramen ovale)  PLAN:  1. LEXI for evaluation of PFO    -The risks, benefits & alternatives of the procedure were explained to the patient.    -The risks of transesophageal echo include but are not limited to:  Dental trauma, esophageal trauma/perforation, bleeding, laryngospasm/brochospasm, aspiration, sore throat/hoarseness, & dislodgement of the endotracheal tube/nasogastric tube (where applicable).    -The risks of moderate sedation include hypotension, respiratory depression, arrhythmias, bronchospasm, & death.    -Informed consent was obtained & the patient is agreeable to proceed with the procedure.    I will discuss with the attending physician. Attending addendum is to follow.     Further recommendations per attending addendum        VTE Risk Mitigation (From admission, onward)    None          Manolo Berry MD  Cardiology   [facility]

## 2019-03-27 NOTE — PLAN OF CARE
Problem: Adult Inpatient Plan of Care  Goal: Plan of Care Review  Outcome: Ongoing (interventions implemented as appropriate)  Patient discharged per MD orders. Instructions given on medications, activity, when to call MD, and follow up appointments. Pt instructed not to drive x 24 hours due to receiving sedation. Pt verbalized understanding. Patient AAOx4, VSS, no c/o pain or discomfort at this time. Telemetry and PIV removed. Patient and friend refused transport, ambulated off unit.

## 2019-03-27 NOTE — HPI
Claudette Gilhespie is a 19 y.o. woman with PMHx of h/o migraine headaches, TIA presents for evaluation for PFO.    Dysphagia or odynophagia:  No  Liver Disease, esophageal disease, or known varices:  No  Upper GI Bleeding: No  Snoring:  No  Sleep Apnea:  No  Prior neck surgery or radiation:  No  History of anesthetic difficulties:  No  Family history of anesthetic difficulties:  No  Last oral intake:  12 hours ago  Able to move neck in all directions:  Yes  Anticoagulation/Antiplatelets:plavix and aspirin

## 2019-03-27 NOTE — SUBJECTIVE & OBJECTIVE
Past Medical History:   Diagnosis Date    Migraine with aura and without status migrainosus, not intractable 9/21/2017    Had to take pizotifen for [place]raines in the [place] for 6 months Normally gets a headache after tennis matches Medrol dose pack helpful    PFO (patent foramen ovale) 3/12/2019    TIA (transient ischemic attack) 3/12/2019       History reviewed. No pertinent surgical history.    Review of patient's allergies indicates:  No Known Allergies    No current facility-administered medications on file prior to encounter.      Current Outpatient Medications on File Prior to Encounter   Medication Sig    aspirin (ECOTRIN) 81 MG EC tablet Take 1 tablet (81 mg total) by mouth once daily.    clopidogrel (PLAVIX) 75 mg tablet Take 1 tablet (75 mg total) by mouth once daily.    simvastatin (ZOCOR) 20 MG tablet TK 1 T PO HS     Family History     None        Tobacco Use    Smoking status: Never Smoker    Smokeless tobacco: Never Used   Substance and Sexual Activity    Alcohol use: No    Drug use: No    Sexual activity: Not on file     Review of Systems   All other systems reviewed and are negative.    Objective:     Vital Signs (Most Recent):  Temp: 98 °F (36.7 °C) (03/27/19 1315)  Pulse: (!) 59 (03/27/19 1315)  Resp: 20 (03/27/19 1315)  BP: (!) 114/56 (03/27/19 1328)  SpO2: 100 % (03/27/19 1315) Vital Signs (24h Range):  Temp:  [98 °F (36.7 °C)] 98 °F (36.7 °C)  Pulse:  [59] 59  Resp:  [20] 20  SpO2:  [100 %] 100 %  BP: (105-114)/(56) 114/56     Weight: 60 kg (132 lb 4.4 oz)  Body mass index is 20.11 kg/m².    SpO2: 100 %  O2 Device (Oxygen Therapy): room air    No intake or output data in the 24 hours ending 03/27/19 1350    Lines/Drains/Airways     Peripheral Intravenous Line                 Peripheral IV - Single Lumen 03/27/19 1327 Left Antecubital less than 1 day                Physical Exam  General: alert, awake and oriented x 3  Eyes:PERRL.   Neck:no JVD   Lungs:  clear to auscultation bilaterally    Cardiovascular: Heart: regular rate and rhythm, S1, S2 normal, no murmur, click, rub or gallop.   Chest Wall: no tenderness.   Pulses-2+ radial, femoral, DP, PT b/l  Extremities: no cyanosis or edema.   Abdomen/Rectal: Abdomen: soft, non-tender non-distented; bowel sounds normal  Neurologic: Normal strength and tone. No focal numbness or weakness  Significant Labs: CMP No results for input(s): NA, K, CL, CO2, GLU, BUN, CREATININE, CALCIUM, PROT, ALBUMIN, BILITOT, ALKPHOS, AST, ALT, ANIONGAP, ESTGFRAFRICA, EGFRNONAA in the last 48 hours., CBC No results for input(s): WBC, HGB, HCT, PLT in the last 48 hours. and INR No results for input(s): INR, PROTIME in the last 48 hours.    Significant Imaging: Echocardiogram:   2D echo with color flow doppler:   Results for orders placed or performed during the hospital encounter of 08/28/18   Echo doppler color flow   Result Value Ref Range    QEF 60 55 - 65    Diastolic Dysfunction No     Est. PA Systolic Pressure 24.65     and Transthoracic echo (TTE) complete (Cupid Only): No results found for this or any previous visit.

## 2019-03-28 ENCOUNTER — TELEPHONE (OUTPATIENT)
Dept: CARDIOLOGY | Facility: CLINIC | Age: 19
End: 2019-03-28

## 2019-03-28 ENCOUNTER — DOCUMENTATION ONLY (OUTPATIENT)
Dept: CARDIOLOGY | Facility: CLINIC | Age: 19
End: 2019-03-28

## 2019-03-28 NOTE — TELEPHONE ENCOUNTER
Called with results of LEXI. Patient scheduled for PFO closure on 4/22/19. Verbalized understanding.

## 2019-03-28 NOTE — PROGRESS NOTES
OUTPATIENT CATHETERIZATION INSTRUCTIONS    You have been scheduled for a procedure in the catheterization lab on Wednesday May 1, 2019.     Please report to the Cardiology Waiting Area on the Third floor of the hospital and check in at 7 AM.   You will then be taken to the SSCU (Short Stay Cardiac Unit) and prepared for your procedure. Please be aware that this is not the time of your procedure but the time you are to arrive. The procedures are scheduled on an hourly basis; however, emergency cases take precedence over all other cases.       You may not have anything to eat or drink after midnight the night before your test. You may take your regular morning medications with water. If there are any medications that you should not take you will be instructed to hold them that morning. If you are diabetic and on Metformin (Glucophage) do not take it the day before, the day of, and for 2 days after your procedure.      The procedure will take 1-2 hours to perform. After the procedure, you will return to SSCU on the third floor of the hospital. You will need to lie still (or keep your arm still) for the next 4 to 6 hours to minimize bleeding from the puncture site. Your family may remain in the room with you during this time.       You may be able to be discharged home that same afternoon if there is someone to drive you home and there were no complications. If you have one of the balloon, stent, or device procedures you may spend the night in the hospital. Your doctor will determine, based on your progress, the date and time of your discharge. The results of your procedure will be discussed with you before you are discharged. Any further testing or procedures will be scheduled for you either before you leave or you will be called with these appointments.       If you should have any questions, concerns, or need to change the date of your procedure, please call ATUL Baker @ [phone]    Special  Instructions:    None        THE ABOVE INSTRUCTIONS WERE GIVEN TO THE PATIENT VERBALLY AND THEY VERBALIZED UNDERSTANDING.  THEY DO NOT REQUIRE ANY SPECIAL NEEDS AND DO NOT HAVE ANY LEARNING BARRIERS.          Directions for Reporting to Cardiology Waiting Area in the Hospital  If you park in the Parking Garage:  Take elevators to the1st floor of the parking garage.  Continue past the gift shop, coffee shop, and piano.  Take a right and go to the gold elevators. (Elevator B)  Take the elevator to the 3rd floor.  Follow the arrow on the sign on the wall that says Cath Lab Registration/EP Lab Registration.  Follow the long hallway all the way around until you come to a big open area.  This is the registration area.  Check in at Reception Desk.    OR    If family is dropping you off:  Have them drop you off at the front of the Hospital under the green overhang.  Enter through the doors and take a right.  Take the E elevators to the 3rd floor Cardiology Waiting Area.  Check in at the Reception Desk in the waiting room.

## 2019-03-29 LAB
OHS CV EVENT MONITOR DAY: 0
OHS CV HOLTER LENGTH DECIMAL HOURS: 48
OHS CV HOLTER LENGTH HOURS: 48
OHS CV HOLTER LENGTH MINUTES: 0

## 2019-04-16 ENCOUNTER — OFFICE VISIT (OUTPATIENT)
Dept: URGENT CARE | Facility: CLINIC | Age: 19
End: 2019-04-16
Payer: COMMERCIAL

## 2019-04-16 VITALS
RESPIRATION RATE: 16 BRPM | HEIGHT: 68 IN | HEART RATE: 58 BPM | WEIGHT: 132.25 LBS | DIASTOLIC BLOOD PRESSURE: 58 MMHG | TEMPERATURE: 98 F | SYSTOLIC BLOOD PRESSURE: 127 MMHG | OXYGEN SATURATION: 100 % | BODY MASS INDEX: 20.04 KG/M2

## 2019-04-16 DIAGNOSIS — L29.9 PRURITUS: Primary | ICD-10-CM

## 2019-04-16 PROCEDURE — 99499 UNLISTED E&M SERVICE: CPT | Mod: S$GLB,,, | Performed by: FAMILY MEDICINE

## 2019-04-16 PROCEDURE — 99499 NO LOS: ICD-10-PCS | Mod: S$GLB,,, | Performed by: FAMILY MEDICINE

## 2019-04-16 RX ORDER — PREDNISONE 20 MG/1
20 TABLET ORAL DAILY
Refills: 0 | COMMUNITY
Start: 2019-02-14 | End: 2019-04-16

## 2019-04-16 RX ORDER — ASPIRIN 325 MG
TABLET ORAL
Refills: 0 | COMMUNITY
Start: 2019-03-05 | End: 2019-04-16

## 2019-04-16 RX ORDER — OSELTAMIVIR PHOSPHATE 75 MG/1
CAPSULE ORAL
Refills: 0 | COMMUNITY
Start: 2019-02-14 | End: 2019-04-16

## 2019-04-16 RX ORDER — CLOPIDOGREL BISULFATE 75 MG/1
TABLET ORAL
Refills: 5 | Status: ON HOLD | COMMUNITY
Start: 2019-04-06 | End: 2019-05-01 | Stop reason: HOSPADM

## 2019-04-16 RX ORDER — SIMVASTATIN 20 MG/1
TABLET, FILM COATED ORAL
Refills: 0 | COMMUNITY
Start: 2019-03-05 | End: 2019-04-16

## 2019-04-16 NOTE — PROGRESS NOTES
"Subjective:       Patient ID: Claudette Gilheopie is a 19 y.o. female.    Vitals:    04/16/19 0912   BP: (!) 127/58   Pulse: (!) 58   Resp: 16   Temp: 97.7 °F (36.5 °C)   TempSrc: Oral   SpO2: 100%   Weight: 60 kg (132 lb 4.4 oz)   Height: 5' 8" (1.727 m)       Chief Complaint: Rash    Pt states she started itching all over a few days ago. Pt states it seems to migrate to different parts of her body.  Pt states she had some bumps on her stomach that went away. Pt has not taken anything for it.  Pt had a stroke last month and is on blood thinners and stopped taking the aspirin a week and a half ago. Pt did switch detergent to tide but thinks she was using tide but a different brand.  Patient is a 19-year-old female here for pruritus that started approximately 3 days ago.  Patient reports that she has itching in her scalp at times, itching on the back of her neck, itching on her abdomen, and itching on her bilateral calves. The itching comes on at different times in different places.  Patient has a history of stroke approximately 6 weeks ago for which she was placed on Plavix aspirin and simvastatin.  Patient is no longer taking the aspirin or simvastatin but continues to take the Plavix.  Patient also reports that she changed laundry detergents a few weeks ago.  Reports seeing a slight rash on the different parts of her body that were itching but only after she was scratching otherwise no rashes present.  Patient denies any known history of liver or gallbladder problems.  Patient denies any signs of jaundice.  Patient has a patent foramen ovale for which will be surgically repaired on May 1st of this year.  Patient is active and a .    Rash   This is a new problem. The current episode started in the past 7 days. The problem has been waxing and waning since onset. The rash is diffuse. The rash is characterized by itchiness. She was exposed to a new detergent/soap. Pertinent negatives include no anorexia, " congestion, fever, joint pain, shortness of breath or sore throat. Past treatments include moisturizer. The treatment provided no relief. There is no history of allergies, asthma or eczema.     Review of Systems   Constitution: Negative for chills and fever.   HENT: Negative for congestion and sore throat.    Respiratory: Negative for shortness of breath.    Skin: Negative for itching and rash.   Musculoskeletal: Negative for joint pain.   Gastrointestinal: Negative for anorexia.       Objective:      Physical Exam   Constitutional: She is oriented to person, place, and time. She appears well-developed and well-nourished.   HENT:   Head: Normocephalic and atraumatic. Head is without abrasion, without contusion and without laceration.   Right Ear: External ear normal.   Left Ear: External ear normal.   Nose: Nose normal.   Mouth/Throat: Oropharynx is clear and moist.   Eyes: Pupils are equal, round, and reactive to light. Conjunctivae, EOM and lids are normal.   Neck: Trachea normal, full passive range of motion without pain and phonation normal. Neck supple.   Cardiovascular: Normal rate and regular rhythm.   Murmur heard.   Systolic murmur is present with a grade of 2/6.  Pulmonary/Chest: Effort normal and breath sounds normal. No stridor. No respiratory distress.   Musculoskeletal: Normal range of motion.   Neurological: She is alert and oriented to person, place, and time.   Skin: Skin is warm, dry and intact. Capillary refill takes less than 2 seconds. No abrasion, no bruising, no burn, no ecchymosis, no laceration, no lesion and no rash noted. No erythema.   Psychiatric: She has a normal mood and affect. Her speech is normal and behavior is normal. Judgment and thought content normal. Cognition and memory are normal.   Nursing note and vitals reviewed.      Assessment:       1. Pruritus        Plan:       Claudette was seen today for rash.    Diagnoses and all orders for this visit:    Pruritus     Patient may  take Zyrtec, Claritin, or Allegra daily for her itching.  Patient should follow up with her cardiologist to discuss side effects of the Plavix.  Patient should change detergent to a hypo allergenic detergent such as tide Free.  If no improvement, patient may return for further workup such as a CMP to look at bilirubin levels.

## 2019-04-16 NOTE — PATIENT INSTRUCTIONS
Take either Zyrtec, claritan, or Allegra daily for itching  Change detergent to tide free  Please follow-up with your cardiologist and other providers

## 2019-05-01 ENCOUNTER — HOSPITAL ENCOUNTER (OUTPATIENT)
Facility: HOSPITAL | Age: 19
Discharge: HOME | DRG: 274 | End: 2019-05-01
Attending: INTERNAL MEDICINE | Admitting: INTERNAL MEDICINE
Payer: COMMERCIAL

## 2019-05-01 VITALS
HEIGHT: 68 IN | WEIGHT: 132.25 LBS | HEART RATE: 65 BPM | RESPIRATION RATE: 18 BRPM | DIASTOLIC BLOOD PRESSURE: 59 MMHG | SYSTOLIC BLOOD PRESSURE: 110 MMHG | BODY MASS INDEX: 20.04 KG/M2 | TEMPERATURE: 97 F | OXYGEN SATURATION: 99 %

## 2019-05-01 DIAGNOSIS — Q21.12 PFO (PATENT FORAMEN OVALE): Primary | ICD-10-CM

## 2019-05-01 LAB
ABO + RH BLD: NORMAL
ANION GAP SERPL CALC-SCNC: 9 MMOL/L (ref 8–16)
B-HCG UR QL: NEGATIVE
BASOPHILS # BLD AUTO: 0.02 K/UL (ref 0–0.2)
BASOPHILS NFR BLD: 0.3 % (ref 0–1.9)
BLD GP AB SCN CELLS X3 SERPL QL: NORMAL
BUN SERPL-MCNC: 13 MG/DL (ref 6–20)
CALCIUM SERPL-MCNC: 9.6 MG/DL (ref 8.7–10.5)
CHLORIDE SERPL-SCNC: 108 MMOL/L (ref 95–110)
CO2 SERPL-SCNC: 22 MMOL/L (ref 23–29)
CREAT SERPL-MCNC: 0.8 MG/DL (ref 0.5–1.4)
CTP QC/QA: YES
DIFFERENTIAL METHOD: NORMAL
EOSINOPHIL # BLD AUTO: 0.1 K/UL (ref 0–0.5)
EOSINOPHIL NFR BLD: 1.4 % (ref 0–8)
ERYTHROCYTE [DISTWIDTH] IN BLOOD BY AUTOMATED COUNT: 12.7 % (ref 11.5–14.5)
EST. GFR  (AFRICAN AMERICAN): >60 ML/MIN/1.73 M^2
EST. GFR  (NON AFRICAN AMERICAN): >60 ML/MIN/1.73 M^2
GLUCOSE SERPL-MCNC: 83 MG/DL (ref 70–110)
HCT VFR BLD AUTO: 38.4 % (ref 37–48.5)
HGB BLD-MCNC: 12.8 G/DL (ref 12–16)
IMM GRANULOCYTES # BLD AUTO: 0.02 K/UL (ref 0–0.04)
IMM GRANULOCYTES NFR BLD AUTO: 0.3 % (ref 0–0.5)
INR PPP: 1.1 (ref 0.8–1.2)
LYMPHOCYTES # BLD AUTO: 1.2 K/UL (ref 1–4.8)
LYMPHOCYTES NFR BLD: 19.6 % (ref 18–48)
MCH RBC QN AUTO: 29.2 PG (ref 27–31)
MCHC RBC AUTO-ENTMCNC: 33.3 G/DL (ref 32–36)
MCV RBC AUTO: 88 FL (ref 82–98)
MONOCYTES # BLD AUTO: 0.6 K/UL (ref 0.3–1)
MONOCYTES NFR BLD: 9 % (ref 4–15)
NEUTROPHILS # BLD AUTO: 4.3 K/UL (ref 1.8–7.7)
NEUTROPHILS NFR BLD: 69.4 % (ref 38–73)
NRBC BLD-RTO: 0 /100 WBC
PLATELET # BLD AUTO: 248 K/UL (ref 150–350)
PMV BLD AUTO: 10.4 FL (ref 9.2–12.9)
POTASSIUM SERPL-SCNC: 4.1 MMOL/L (ref 3.5–5.1)
PROTHROMBIN TIME: 10.9 SEC (ref 9–12.5)
RBC # BLD AUTO: 4.38 M/UL (ref 4–5.4)
SODIUM SERPL-SCNC: 139 MMOL/L (ref 136–145)
WBC # BLD AUTO: 6.22 K/UL (ref 3.9–12.7)

## 2019-05-01 PROCEDURE — 99152 PR MOD CONSCIOUS SEDATION, SAME PHYS, 5+ YRS, FIRST 15 MIN: ICD-10-PCS | Mod: ,,, | Performed by: INTERNAL MEDICINE

## 2019-05-01 PROCEDURE — C1769 GUIDE WIRE: HCPCS | Performed by: INTERNAL MEDICINE

## 2019-05-01 PROCEDURE — 81025 URINE PREGNANCY TEST: CPT | Performed by: INTERNAL MEDICINE

## 2019-05-01 PROCEDURE — C1817 SEPTAL DEFECT IMP SYS: HCPCS | Performed by: INTERNAL MEDICINE

## 2019-05-01 PROCEDURE — 86901 BLOOD TYPING SEROLOGIC RH(D): CPT

## 2019-05-01 PROCEDURE — 99152 MOD SED SAME PHYS/QHP 5/>YRS: CPT | Performed by: INTERNAL MEDICINE

## 2019-05-01 PROCEDURE — 93662 INTRACARDIAC ECG (ICE): CPT | Performed by: INTERNAL MEDICINE

## 2019-05-01 PROCEDURE — 93580 PR PERC CLOS,CONG INTERATRIAL COMMUN W/IMPL: ICD-10-PCS | Mod: ,,, | Performed by: INTERNAL MEDICINE

## 2019-05-01 PROCEDURE — 11000001 HC ACUTE MED/SURG PRIVATE ROOM

## 2019-05-01 PROCEDURE — 85025 COMPLETE CBC W/AUTO DIFF WBC: CPT

## 2019-05-01 PROCEDURE — 80048 BASIC METABOLIC PNL TOTAL CA: CPT

## 2019-05-01 PROCEDURE — 93010 EKG 12-LEAD: ICD-10-PCS | Mod: ,,, | Performed by: INTERNAL MEDICINE

## 2019-05-01 PROCEDURE — 25000003 PHARM REV CODE 250: Performed by: INTERNAL MEDICINE

## 2019-05-01 PROCEDURE — 93005 ELECTROCARDIOGRAM TRACING: CPT

## 2019-05-01 PROCEDURE — 99152 MOD SED SAME PHYS/QHP 5/>YRS: CPT | Mod: ,,, | Performed by: INTERNAL MEDICINE

## 2019-05-01 PROCEDURE — 93580 TRANSCATH CLOSURE OF ASD: CPT | Mod: ,,, | Performed by: INTERNAL MEDICINE

## 2019-05-01 PROCEDURE — 93662 PR INTRACARD ECHO, THER/DX INTERVENT: ICD-10-PCS | Mod: 26,,, | Performed by: INTERNAL MEDICINE

## 2019-05-01 PROCEDURE — 27201423 OPTIME MED/SURG SUP & DEVICES STERILE SUPPLY: Performed by: INTERNAL MEDICINE

## 2019-05-01 PROCEDURE — 25000003 PHARM REV CODE 250: Performed by: STUDENT IN AN ORGANIZED HEALTH CARE EDUCATION/TRAINING PROGRAM

## 2019-05-01 PROCEDURE — 85610 PROTHROMBIN TIME: CPT

## 2019-05-01 PROCEDURE — C1753 CATH, INTRAVAS ULTRASOUND: HCPCS | Performed by: INTERNAL MEDICINE

## 2019-05-01 PROCEDURE — 93662 INTRACARDIAC ECG (ICE): CPT | Mod: 26,,, | Performed by: INTERNAL MEDICINE

## 2019-05-01 PROCEDURE — 99153 MOD SED SAME PHYS/QHP EA: CPT | Performed by: INTERNAL MEDICINE

## 2019-05-01 PROCEDURE — 93010 ELECTROCARDIOGRAM REPORT: CPT | Mod: ,,, | Performed by: INTERNAL MEDICINE

## 2019-05-01 PROCEDURE — 63600175 PHARM REV CODE 636 W HCPCS: Performed by: INTERNAL MEDICINE

## 2019-05-01 PROCEDURE — 93580 TRANSCATH CLOSURE OF ASD: CPT | Performed by: INTERNAL MEDICINE

## 2019-05-01 PROCEDURE — C1894 INTRO/SHEATH, NON-LASER: HCPCS | Performed by: INTERNAL MEDICINE

## 2019-05-01 PROCEDURE — A4216 STERILE WATER/SALINE, 10 ML: HCPCS | Performed by: STUDENT IN AN ORGANIZED HEALTH CARE EDUCATION/TRAINING PROGRAM

## 2019-05-01 DEVICE — IMPLANTABLE DEVICE
Type: IMPLANTABLE DEVICE | Site: HEART | Status: FUNCTIONAL
Brand: AMPLATZER™

## 2019-05-01 RX ORDER — SODIUM CHLORIDE 9 MG/ML
INJECTION, SOLUTION INTRAVENOUS ONCE
Status: COMPLETED | OUTPATIENT
Start: 2019-05-01 | End: 2019-05-01

## 2019-05-01 RX ORDER — HEPARIN SODIUM 1000 [USP'U]/ML
INJECTION, SOLUTION INTRAVENOUS; SUBCUTANEOUS
Status: DISCONTINUED | OUTPATIENT
Start: 2019-05-01 | End: 2019-05-01 | Stop reason: HOSPADM

## 2019-05-01 RX ORDER — CEFAZOLIN SODIUM 1 G/3ML
INJECTION, POWDER, FOR SOLUTION INTRAMUSCULAR; INTRAVENOUS
Status: DISCONTINUED | OUTPATIENT
Start: 2019-05-01 | End: 2019-05-01 | Stop reason: HOSPADM

## 2019-05-01 RX ORDER — ACETAMINOPHEN 325 MG/1
650 TABLET ORAL EVERY 4 HOURS PRN
Status: DISCONTINUED | OUTPATIENT
Start: 2019-05-01 | End: 2019-05-01 | Stop reason: HOSPADM

## 2019-05-01 RX ORDER — SODIUM CHLORIDE 9 MG/ML
INJECTION, SOLUTION INTRAVENOUS CONTINUOUS
Status: ACTIVE | OUTPATIENT
Start: 2019-05-01 | End: 2019-05-01

## 2019-05-01 RX ORDER — FENTANYL CITRATE 50 UG/ML
INJECTION, SOLUTION INTRAMUSCULAR; INTRAVENOUS
Status: DISCONTINUED | OUTPATIENT
Start: 2019-05-01 | End: 2019-05-01 | Stop reason: HOSPADM

## 2019-05-01 RX ORDER — SODIUM CHLORIDE 0.9 % (FLUSH) 0.9 %
5 SYRINGE (ML) INJECTION
Status: DISCONTINUED | OUTPATIENT
Start: 2019-05-01 | End: 2019-05-01 | Stop reason: HOSPADM

## 2019-05-01 RX ORDER — MIDAZOLAM HYDROCHLORIDE 1 MG/ML
INJECTION, SOLUTION INTRAMUSCULAR; INTRAVENOUS
Status: DISCONTINUED | OUTPATIENT
Start: 2019-05-01 | End: 2019-05-01 | Stop reason: HOSPADM

## 2019-05-01 RX ORDER — LIDOCAINE HYDROCHLORIDE 20 MG/ML
INJECTION, SOLUTION INFILTRATION; PERINEURAL
Status: DISCONTINUED | OUTPATIENT
Start: 2019-05-01 | End: 2019-05-01 | Stop reason: HOSPADM

## 2019-05-01 RX ORDER — PROTAMINE SULFATE 10 MG/ML
INJECTION, SOLUTION INTRAVENOUS
Status: DISCONTINUED | OUTPATIENT
Start: 2019-05-01 | End: 2019-05-01 | Stop reason: HOSPADM

## 2019-05-01 RX ORDER — HEPARIN SODIUM 200 [USP'U]/100ML
INJECTION, SOLUTION INTRAVENOUS
Status: DISCONTINUED | OUTPATIENT
Start: 2019-05-01 | End: 2019-05-01 | Stop reason: HOSPADM

## 2019-05-01 RX ADMIN — ACETAMINOPHEN 650 MG: 325 TABLET ORAL at 12:05

## 2019-05-01 NOTE — SUBJECTIVE & OBJECTIVE
Past Medical History:   Diagnosis Date    Migraine with aura and without status migrainosus, not intractable 9/21/2017    Had to take pizotifen for [place]ines in the [place] for [place] Normally gets a headache after tennis matches Medrol dose pack helpful    PFO (patent foramen ovale) 3/12/2019    Stroke     TIA (transient ischemic attack) 3/12/2019     Past Surgical History:   Procedure Laterality Date    ECHOCARDIOGRAM,TRANSESOPHAGEAL N/A 3/27/2019    Performed by [facility] Provider at [facility]     Review of patient's allergies indicates:  No Known Allergies    PTA Medications   Medication Sig    aspirin (ECOTRIN) 81 MG EC tablet Take 1 tablet (81 mg total) by mouth once daily.    clopidogrel (PLAVIX) 75 mg tablet Take 1 tablet (75 mg total) by mouth once daily.    clopidogrel (PLAVIX) 75 mg tablet TK 1 T PO  QD    fluticasone (FLONASE) 50 mcg/actuation nasal spray 2 sprays (100 mcg total) by Each Nare route once daily.    levocetirizine (XYZAL) 5 MG tablet Take 1 tablet (5 mg total) by mouth once daily.    progesterone (PROMETRIUM) 100 MG capsule Take 100 mg by mouth once daily.    simvastatin (ZOCOR) 20 MG tablet TK 1 T PO HS     Family History     Problem Relation (Age of Onset)    No Known Problems Mother, Father        Tobacco Use    Smoking status: Never Smoker    Smokeless tobacco: Never Used   Substance and Sexual Activity    Alcohol use: No    Drug use: No    Sexual activity: Not on file     Review of Systems   Constitution: Negative for chills, fever and weight loss.   HENT: Negative for sore throat and stridor.    Eyes: Negative for blurred vision, double vision and pain.   Cardiovascular: Negative for chest pain, claudication, dyspnea on exertion, near-syncope, orthopnea, palpitations, paroxysmal nocturnal dyspnea and syncope.   Respiratory: Negative for cough, shortness of breath, sputum production and wheezing.    Endocrine: Negative for polydipsia, polyphagia and polyuria.    Skin: Negative for rash.   Musculoskeletal: Negative for joint pain, joint swelling and myalgias.   Gastrointestinal: Negative for abdominal pain, constipation, diarrhea, heartburn, melena, nausea and vomiting.   Genitourinary: Negative for dysuria and hematuria.   Neurological: Negative for focal weakness and loss of balance.   Psychiatric/Behavioral: Negative for depression and memory loss.     Objective:     Vital Signs (Most Recent):  Temp: 96.2 °F (35.7 °C) (05/01/19 0749)  Pulse: (!) 53 (05/01/19 0749)  Resp: 18 (05/01/19 0749)  BP: (!) 109/57 (05/01/19 0753)  SpO2: 100 % (05/01/19 0749) Vital Signs (24h Range):  Temp:  [96.2 °F (35.7 °C)] 96.2 °F (35.7 °C)  Pulse:  [53] 53  Resp:  [18] 18  SpO2:  [100 %] 100 %  BP: (109)/(57-64) 109/57     Weight: 60 kg (132 lb 4.4 oz)  Body mass index is 20.11 kg/m².  SpO2: 100 %  O2 Device (Oxygen Therapy): room air    No intake or output data in the 24 hours ending 05/01/19 0831    Lines/Drains/Airways     Peripheral Intravenous Line                 Peripheral IV - Single Lumen 05/01/19 0818 20 G Right Antecubital less than 1 day              Physical Exam   Constitutional: She is oriented to person, place, and time. She appears well-developed and well-nourished.   HENT:   Head: Normocephalic and atraumatic.   Eyes: Pupils are equal, round, and reactive to light.   Neck: Normal range of motion. No JVD present.   Cardiovascular: Normal rate and regular rhythm.   No murmur heard.  Pulmonary/Chest: Effort normal and breath sounds normal. She has no wheezes. She has no rales.   Abdominal: Soft. Bowel sounds are normal. She exhibits no distension. There is no tenderness.   Neurological: She is alert and oriented to person, place, and time.   Skin: Skin is warm and dry.

## 2019-05-01 NOTE — HPI
Ms. Claudette Gilhespie is a 19 y.o. woman with PMHx of PFO, CVA/TIA in 2/2019, and migraine headaches.    She was first seen in clinic in August 2018 for a heart murmur. TTE without significant valvular dysfunction, incidentally found small PFO with L to R shunt. Asymptomatic, very active as a college  and doing well at that time, no further intervention recommended at that time.     She presented to clinic again in 3/2019 after a CVA/TIA in February. While watching a tennis match, she began having R sided weakness lasting about 4 hrs ago as well as word-finding difficulty and slurred speech for ~2 hrs. She was seen in the ED, diagnosed with a TIA. She is feeling back to normal with exception of feeling slightly weaker in the right arm and feels like she's having similar symptoms to post-concussion- delayed recall, mild headaches. Otherwise, doing well. She was started on  and crestor 10. Her estrogen based OCP was discontinued and she was advised to never take OCPs again. Hypercoagauable workup ordered, results pending. Denies hx of clots. 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 Also flies 8 hr flight from [place], last in January without prior issues.    LEXI in 4/2019 confirmed PFO with R > L shunt.    She now presents for PFO closure. Feeling well, no new complaints, no CVA/TIA symptoms since her last clinic visit.  NPO since yesterday.  Has been on Plavix, but stopped taking ASA about 3 weeks ago.  Labs drawn this AM, in process.

## 2019-05-01 NOTE — H&P
Interventional Cardiology H & PNote  Attending Physician: Chip Wilson MD      HPI:   Claudette Gilhespie is a 19 y.o. woman with PMHx of h/o migraine headaches, TIA presents for PFO closure.    She was previously seen in clinic in August 2018 for a heart murmur. TTE without significant valvular dysfunction, incidentally found small PFO with L to R shunt. Asymptomatic, very active as a college  and doing well at that time, no further intervention recommended at that time.     She presents again now after a TIA last month. While watching a tennis match, she began having R sided weakness lasting about 4 hrs ago as well as word-finding difficulty and slurred speech for ~2 hrs. She was seen in the ED, diagnosed with a TIA. She is feeling back to normal with exception of feeling slightly weaker in the right arm and feels like she's having similar symptoms to post-concussion- delayed recall, mild headaches. Otherwise, doing well. She was started on  and crestor 10. Her estrogen based OCP was discontinued and she was advised to never take OCPs again. Hypercoagauable workup ordered, will need to obtain records. Denies hx of clots. 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 Also flies 8 hr flight from [place], last in January without prior issues.        ROS:    Constitution: negative for - fever, chills, weight loss or weight gain  HENT: negative for - sore throat, rhinorrhea, or headache  Eyes: negative for - blurred or double vision  Cardiovascular: no chest pain or dyspnea on exertion  Pulmonary: no cough, shortness of breath, or wheezing  Gastrointestinal: negative for - abdominal pain, nausea, vomiting, or diarrhea  : negative for - dysuria  Neurological: negative for - focal weakness or sensory changes  PMH:     Past Medical History:   Diagnosis Date    Migraine with  aura and without status migrainosus, not intractable 9/21/2017    Had to take pizotifen for migraines in the [place] for 6 months Normally gets a headache after tennis matches Medrol dose pack helpful    PFO (patent foramen ovale) 3/12/2019    Stroke     TIA (transient ischemic attack) 3/12/2019     Past Surgical History:   Procedure Laterality Date    ECHOCARDIOGRAM,TRANSESOPHAGEAL N/A 3/27/2019    Performed by [facility] at [facility] EP [facility]     Allergies:   Review of patient's allergies indicates:  No Known Allergies  Medications:     No current facility-administered medications on file prior to encounter.      Current Outpatient Medications on File Prior to Encounter   Medication Sig Dispense Refill    aspirin (ECOTRIN) 81 MG EC tablet Take 1 tablet (81 mg total) by mouth once daily.  0    clopidogrel (PLAVIX) 75 mg tablet Take 1 tablet (75 mg total) by mouth once daily. 30 tablet 6    fluticasone (FLONASE) 50 mcg/actuation nasal spray 2 sprays (100 mcg total) by Each Nare route once daily. 1 Bottle 0    levocetirizine (XYZAL) 5 MG tablet Take 1 tablet (5 mg total) by mouth once daily. 30 tablet 0    progesterone (PROMETRIUM) 100 MG capsule Take 100 mg by mouth once daily.      simvastatin (ZOCOR) 20 MG tablet TK 1 T PO HS  0       Inpatient Medications   Continuous Infusions:  Scheduled Meds:   sodium chloride 0.9%   Intravenous Once     PRN Meds:sodium chloride 0.9%     Social History:     Social History     Tobacco Use    Smoking status: Never Smoker    Smokeless tobacco: Never Used   Substance Use Topics    Alcohol use: No     Family History:     Family History   Problem Relation Age of Onset    No Known Problems Mother     No Known Problems Father      Physical Exam:     Vitals:  Temp:  [96.2 °F (35.7 °C)]   Pulse:  [53]   Resp:  [18]   BP: (109)/(57-64)   SpO2:  [100 %]  on RA I/O's:  No intake or output data in the 24 hours ending 05/01/19 0815     Constitutional: NAD,  conversant  HEENT: Sclera anicteric, PERRLA, EOMI  Neck: No JVD, no carotid bruits  CV: RRR, no murmur, normal S1/S2  Pulm: CTAB, no wheezes, rales, or ronchi  GI: Abdomen soft, NTND, +BS  Extremities: No LE edema, warm and well perfused  Skin: No ecchymosis, erythema, or ulcers  Psych: AOx3, appropriate affect  Neuro: CNII-XII intact, no focal deficits    Labs:     No results for input(s): NA, K, CL, CO2, BUN, CREATININE, GLU, ANIONGAP in the last 168 hours.  No results for input(s): TROPONINI, BNP in the last 168 hours. No results for input(s): WBC, HGB, HCT, PLT in the last 168 hours.  No results for input(s): AST, ALT, ALKPHOS, BILITOT, ALBUMIN in the last 168 hours.     Imaging:   No results found for: EF    LEXI 3/27/19:  · Patent foramen ovale present with right to left shunting indicated by saline contrast.    Assessment/Plan:     PFO closure with ICE    11F and 9F access  Small sizing balloon  Cardioform device likely     -The risks, benefits and alternatives of the procedure were explained to the patient.   -The risks of procedure include but are not limited to: bleeding, infection, heart rhythm abnormalities, allergic reactions, kidney injury, stroke and death.   -The risks of moderate sedation include hypotension, respiratory depression, arrhythmias, bronchospasm, and death.   - Informed consent was obtained and the patient is agreeable to proceed with the procedure.      Signed:    Uli Maurer MD  Pager: [phone]  5/1/2019 8:15 AM

## 2019-05-01 NOTE — PLAN OF CARE
Problem: Adult Inpatient Plan of Care  Goal: Plan of Care Review  Outcome: Ongoing (interventions implemented as appropriate)  Pt transferred from cath lab via stretcher.  Vss.  r groin site remains ekaterina with pressure drg.  Post op orders and assessment initiated.  Pt aao. Tolerating po.  Family called to bs.  Pt in no acute distress and verbalizes no complaints.  Will continue to monitor.  Call bell within reach.

## 2019-05-01 NOTE — DISCHARGE SUMMARY
Discharge Summary  Interventional Cardiology      Admit Date: 5/1/2019    Discharge Date:  5/1/2019    Attending Physician: Chip Wilson MD    Discharge Physician: Rodger Miranda MD    Principal Diagnoses: PFO (patent foramen ovale)  Indication for Admission: Closure, PFO (N/A)    Discharged Condition: Good    Hospital Course:   Patient presented for outpatient PFO closure which went without complication. Patient underwent successful placement of a 25 mm AMPLATZER PFO occluder via R CFV access. See full cath report in Epic for details. Hemostasis of patient's R CFV access site was achieved with manual pressure. Patient was monitored post-procedure per protocol, and her R groin access site was c/d/i with no evidence of hematoma this afternoon. She was able to ambulate without difficulty. She was feeling well and anticipating discharge home today.    Outpatient Plan:  - ASA 81 mg Daily and Plavix 75 mg Daily for 6 months  - ASA 81 mg Daily indefinitely after 6 months  - Antibiotic PPx for life  - TTE with bubble study in 6 months  - Follow up with Dr. Wilson in 6 months    Diet: Cardiac diet    Activity: Ad giovanny, wound care instructions provided    Disposition: Home or Self Care    Discharge Medications:      Medication List      CHANGE how you take these medications    clopidogrel 75 mg tablet  Commonly known as:  PLAVIX  Take 1 tablet (75 mg total) by mouth once daily.  What changed:  Another medication with the same name was removed. Continue taking this medication, and follow the directions you see here.        CONTINUE taking these medications    aspirin 81 MG EC tablet  Commonly known as:  ECOTRIN  Take 1 tablet (81 mg total) by mouth once daily.        STOP taking these medications    fluticasone propionate 50 mcg/actuation nasal spray  Commonly known as:  FLONASE     levocetirizine 5 MG tablet  Commonly known as:  XYZAL     progesterone 100 MG capsule  Commonly known as:  PROMETRIUM     simvastatin 20  MG tablet  Commonly known as:  ZOCOR          Follow Up:  Follow-up Information     Chip Wilson MD In 6 months.    Specialties:  Cardiology, INTERVENTIONAL CARDIOLOGY  Contact information:  [facility] [id number] [phone]

## 2019-05-01 NOTE — ASSESSMENT & PLAN NOTE
- PFO closure today  - Anti-platelet Therapy: ASA 81 mg Daily and Clopidogrel 75 mg Daily  - Access: R CFV  - Allergies: No shellfish/Iodine allergy  - Pre-Hydration: 3 cc/kg/hr IV, continuous, for 1 hour, Pre-Procedure  - Pre-Op Med: Diphenhydramine (Benadryl) 50 mg, Oral, Once, Pre-Procedure   - All patient's questions were answered.  - The risks, benefits & alternatives of the procedure were explained to the patient.   - The risks of PFO closure include but are not limited to: Bleeding, infection, heart rhythm abnormalities, allergic reactions, kidney injury requiring dilaysis, limb loss, stroke and death   - Should stenting be indicated, the patient has agreed to dual anti-platelet therapy for 1-consecutive year with a drug-eluting stent and a minimum of 1-month with the use of a bare metal stent  - Additionally, pt is aware that non-compliance is likely to result in stent clotting with heart attack, heart failure, and/or death  - The risks of moderate sedation include hypotension, respiratory depression, arrhythmias, bronchospasm, & death  - Informed consent was obtained & the patient is agreeable to proceed with the procedure

## 2019-05-01 NOTE — PLAN OF CARE
Problem: Adult Inpatient Plan of Care  Goal: Plan of Care Review  Outcome: Ongoing (interventions implemented as appropriate)  Pt arrived to unit accompanied by family.  Vss.  Pt oriented to rm and unit. Pre op orders and assessment initiated.  Pt remains npo.  Pt in no acute distress and verbalizes no complaints.  Will continue to monitor.  Call bell within reach.

## 2019-05-01 NOTE — NURSING
Pt d/c'd to home per md orders.  Vss.  r groin site remains ekaterina.  0 bleed, 0 hematoma.  piv removed intact and without difficulty.  Instructed pt on home medications, post procedure precautions and follow up visits.  Pt and family verbalizes understanding.

## 2019-05-01 NOTE — BRIEF OP NOTE
"    Post Cath Note  Referring Physician: Chip Wilson MD  Procedure: Closure, PFO (N/A)       Access: Right CFV    See full report for further details    Intervention:     Successful placement of a 25 mm AMPLATZER PFO occluder    Closure device: Manual pressure    Post Cath Exam:   BP (!) 108/56 (BP Location: Left arm, Patient Position: Lying)   Pulse 66   Temp 96.6 °F (35.9 °C) (Oral)   Resp 18   Ht 5' 8" (1.727 m)   Wt 60 kg (132 lb 4.4 oz)   LMP 04/09/2019   SpO2 99%   Breastfeeding? No   BMI 20.11 kg/m²   No unusual pain, hematoma, thrill or bruit at vascular access site.  Distal pulse present without signs of ischemia.    Recommendations:   - Routine post-cath care  - IVF at 125 cc/hr for 4 hrs  - ASA 81 mg Daily and Plavix 75 mg Daily for 6 months  - ASA 81 mg Daily indefinitely after 6 months  - TTE with bubble study in 6 months  - Follow up with Dr. Wilson in 6 months    Signed:  Rodger Miranda MD  Cardiology Fellow, PGY-6  5/1/2019 12:12 PM  "

## 2019-08-12 DIAGNOSIS — Q21.12 PFO (PATENT FORAMEN OVALE): ICD-10-CM

## 2019-08-13 RX ORDER — CLOPIDOGREL BISULFATE 75 MG/1
75 TABLET ORAL DAILY
Qty: 30 TABLET | Refills: 2 | Status: SHIPPED | OUTPATIENT
Start: 2019-08-13 | End: 2021-01-19

## 2019-10-04 ENCOUNTER — OFFICE VISIT (OUTPATIENT)
Dept: URGENT CARE | Facility: CLINIC | Age: 19
End: 2019-10-04
Payer: COMMERCIAL

## 2019-10-04 VITALS
WEIGHT: 132 LBS | TEMPERATURE: 98 F | SYSTOLIC BLOOD PRESSURE: 113 MMHG | RESPIRATION RATE: 16 BRPM | HEIGHT: 68 IN | HEART RATE: 64 BPM | OXYGEN SATURATION: 100 % | DIASTOLIC BLOOD PRESSURE: 63 MMHG | BODY MASS INDEX: 20 KG/M2

## 2019-10-04 DIAGNOSIS — J20.9 ACUTE BRONCHITIS, UNSPECIFIED ORGANISM: Primary | ICD-10-CM

## 2019-10-04 DIAGNOSIS — J02.9 SORE THROAT: ICD-10-CM

## 2019-10-04 LAB
CTP QC/QA: YES
CTP QC/QA: YES
FLUAV AG NPH QL: NEGATIVE
FLUBV AG NPH QL: NEGATIVE
S PYO RRNA THROAT QL PROBE: NEGATIVE

## 2019-10-04 PROCEDURE — 87880 POCT RAPID STREP A: ICD-10-PCS | Mod: QW,S$GLB,, | Performed by: PHYSICIAN ASSISTANT

## 2019-10-04 PROCEDURE — 87804 INFLUENZA ASSAY W/OPTIC: CPT | Mod: QW,S$GLB,, | Performed by: PHYSICIAN ASSISTANT

## 2019-10-04 PROCEDURE — 87804 POCT INFLUENZA A/B: ICD-10-PCS | Mod: 59,QW,S$GLB, | Performed by: PHYSICIAN ASSISTANT

## 2019-10-04 PROCEDURE — 99499 NO LOS: ICD-10-PCS | Mod: S$GLB,,, | Performed by: PHYSICIAN ASSISTANT

## 2019-10-04 PROCEDURE — 87880 STREP A ASSAY W/OPTIC: CPT | Mod: QW,S$GLB,, | Performed by: PHYSICIAN ASSISTANT

## 2019-10-04 PROCEDURE — 99499 UNLISTED E&M SERVICE: CPT | Mod: S$GLB,,, | Performed by: PHYSICIAN ASSISTANT

## 2019-10-04 RX ORDER — ALBUTEROL SULFATE 90 UG/1
2 AEROSOL, METERED RESPIRATORY (INHALATION) EVERY 4 HOURS PRN
Qty: 1 INHALER | Refills: 0 | Status: SHIPPED | OUTPATIENT
Start: 2019-10-04 | End: 2021-01-19

## 2019-10-04 RX ORDER — METHYLPREDNISOLONE 4 MG/1
TABLET ORAL
Qty: 1 PACKAGE | Refills: 0 | Status: SHIPPED | OUTPATIENT
Start: 2019-10-04 | End: 2021-01-19 | Stop reason: ALTCHOICE

## 2019-10-04 RX ORDER — GUAIFENESIN 600 MG/1
600 TABLET, EXTENDED RELEASE ORAL 2 TIMES DAILY
Qty: 20 TABLET | Refills: 0 | Status: SHIPPED | OUTPATIENT
Start: 2019-10-04 | End: 2019-10-14

## 2019-10-04 RX ORDER — BENZONATATE 100 MG/1
100 CAPSULE ORAL EVERY 6 HOURS PRN
Qty: 30 CAPSULE | Refills: 1 | Status: SHIPPED | OUTPATIENT
Start: 2019-10-04 | End: 2019-10-11

## 2019-10-04 NOTE — PROGRESS NOTES
"Subjective:       Patient ID: Claudette Carolena Gilhespie is a 19 y.o. female.    Vitals:    10/04/19 1021   BP: 113/63   Pulse: 64   Resp: 16   Temp: 98.2 °F (36.8 °C)   TempSrc: Oral   SpO2: 100%   Weight: 59.9 kg (132 lb)   Height: 5' 8" (1.727 m)       Chief Complaint: Sore Throat    Ms. Claudette is a 18yo female with a PMHx significant for a stroke in early 2019 is presenting today with a chief c/o chest congestion and dry cough x two days. Patient reports associated symptoms include sore throat and PND.  Patient states that the chest congestion causes it to hurt when she coughs. States she is trying to cough up mucus but so far nothing has come up. Denies fever, chills, sinus pain/pressure, sputum production, CP, wheezing, SOB at rest. Denies any aggravating or alleviating factors. Reports she has not yet tried any medications for symptoms as she is nervous with taking new medications due to stroke history.     Sore Throat    This is a new problem. The current episode started in the past 7 days. The problem has been gradually worsening. Neither side of throat is experiencing more pain than the other. There has been no fever. The pain is at a severity of 6/10. The pain is moderate. Associated symptoms include coughing. Pertinent negatives include no abdominal pain, congestion, ear pain, hoarse voice, shortness of breath, trouble swallowing or vomiting. She has had no exposure to strep or mono. She has tried nothing for the symptoms. The treatment provided mild relief.     Review of Systems   Constitution: Negative for chills, fever and malaise/fatigue.   HENT: Positive for sore throat. Negative for congestion, ear pain, hoarse voice and trouble swallowing.    Eyes: Negative for discharge and redness.   Cardiovascular: Negative for chest pain, dyspnea on exertion and leg swelling.   Respiratory: Positive for cough. Negative for shortness of breath, sputum production and wheezing.         Chest congestion "   Skin: Negative for rash.   Musculoskeletal: Negative for myalgias.   Gastrointestinal: Negative for abdominal pain, nausea and vomiting.   Neurological: Negative for dizziness, light-headedness, numbness and weakness.       Objective:      Physical Exam   Constitutional: She is oriented to person, place, and time. Vital signs are normal. She appears well-developed and well-nourished. She is cooperative.  Non-toxic appearance. She does not have a sickly appearance. She does not appear ill. No distress.   Patient is sitting pleasantly on exam table in no acute distress. Nontoxic appearing.    HENT:   Head: Normocephalic and atraumatic.   Right Ear: Hearing, tympanic membrane, external ear and ear canal normal.   Left Ear: Hearing, tympanic membrane, external ear and ear canal normal.   Nose: Nose normal. No mucosal edema, rhinorrhea or nasal deformity. No epistaxis. Right sinus exhibits no maxillary sinus tenderness and no frontal sinus tenderness. Left sinus exhibits no maxillary sinus tenderness and no frontal sinus tenderness.   Mouth/Throat: Uvula is midline and mucous membranes are normal. No trismus in the jaw. Normal dentition. No uvula swelling. Posterior oropharyngeal erythema present. No tonsillar exudate.   Cobblestone appearance with PND   Eyes: Pupils are equal, round, and reactive to light. Conjunctivae and lids are normal. No scleral icterus.   Sclera clear bilat   Neck: Trachea normal, full passive range of motion without pain and phonation normal. Neck supple.   Cardiovascular: Normal rate, regular rhythm, intact distal pulses and normal pulses.   Murmur heard.  Pulmonary/Chest: Effort normal. No respiratory distress. She has wheezes.   Trace expiratory wheezing noted on exam. No signs of respiratory distress. O2 sat 100% on RA.    Abdominal: Soft. Normal appearance and bowel sounds are normal. She exhibits no distension. There is no tenderness.   Musculoskeletal: Normal range of motion. She  exhibits no edema or deformity.   Lymphadenopathy:     She has no cervical adenopathy.   Neurological: She is alert and oriented to person, place, and time. She exhibits normal muscle tone. Coordination normal.   Grossly intact   Skin: Skin is warm, dry and intact. She is not diaphoretic. No pallor.   Psychiatric: She has a normal mood and affect. Her speech is normal and behavior is normal. Judgment and thought content normal. Cognition and memory are normal.   Nursing note and vitals reviewed.      Results for orders placed or performed in visit on 10/04/19   POCT rapid strep A   Result Value Ref Range    Rapid Strep A Screen Negative Negative     Acceptable Yes    POCT Influenza A/B   Result Value Ref Range    Rapid Influenza A Ag Negative Negative    Rapid Influenza B Ag Negative Negative     Acceptable Yes      Flu and rapid strep negative. Sore throat likely secondary to PND/coughing. No evidence of bacterial infection on exam, symptoms are likely viral in nature. Advised on symptomatic care. Advised on return/follow-up precautions. Advised on ER precautions. Answered all patient questions. Patient verbalized understanding and voiced agreement with current treatment plan.    Assessment:       1. Acute bronchitis, unspecified organism    2. Sore throat        Plan:       Claudette was seen today for sore throat.    Diagnoses and all orders for this visit:    Acute bronchitis, unspecified organism  -     methylPREDNISolone (MEDROL DOSEPACK) 4 mg tablet; use as directed on package until gone  -     guaiFENesin (MUCINEX) 600 mg 12 hr tablet; Take 1 tablet (600 mg total) by mouth 2 (two) times daily. for 10 days  -     albuterol (PROVENTIL/VENTOLIN HFA) 90 mcg/actuation inhaler; Inhale 2 puffs into the lungs every 4 (four) hours as needed for Wheezing or Shortness of Breath. Rescue  -     benzonatate (TESSALON PERLES) 100 MG capsule; Take 1 capsule (100 mg total) by mouth every 6 (six)  hours as needed.    Sore throat  -     POCT rapid strep A  -     POCT Influenza A/B      Patient Instructions     If your condition worsens or fails to improve we recommend that you receive another evaluation at the ER immediately or contact your PCP to discuss your concerns or return here. You must understand that you've received an urgent care treatment only and that you may be released before all your medical problems are known or treated. You the patient will arrange for followup care as instructed.    If we discussed your illness is viral, it will likely last 5-7 days; however, the cough can linger for several weeks.     Rest and fluids are important  Can use honey with rose mary to soothe your throat    You received a steroid prescription today -  Steroids can elevate your blood pressure, elevate your blood sugar, water weight gain, nervous energy, redness to the face.    Take inhaler as prescribed and needed for wheezing  Take prescription cough medication (pills) as prescribed as needed for cough.   Take mucinex as directed to help with chest congestion    -  Flonase (fluticasone) is a nasal spray which is available over the counter and may help with symptoms of nasal congestion.     -  Tylenol or ibuprofen can also be used as directed for pain unless you have an allergy to them or medical condition such as stomach ulcers, kidney or liver disease or blood thinners etc for which you should not be taking these type of medications.     Please follow up with your primary care doctor or specialist in the next 48-72hrs as needed and if no improvement.    If you  smoke, please stop smoking.        Bronchitis, Viral (Adult)    You have a viral bronchitis. Bronchitis is inflammation and swelling of the lining of the lungs. This is often caused by an infection. Symptoms include a dry, hacking cough that is worse at night. The cough may bring up yellow-green mucus. You may also feel short of breath or wheeze. Other  symptoms may include tiredness, chest discomfort, and chills.  Bronchitis that is caused by a virus is not treated with antibiotics. Instead, medicines may be given to help relieve symptoms. Symptoms can last up to 2 weeks, although the cough may last much longer.  This illness is contagious during the first few days and is spread through the air by coughing and sneezing, or by direct contact (touching the sick person and then touching your own eyes, nose, or mouth).  Most viral illnesses resolve within 10 to 14 days with rest and simple home remedies, although they may sometimes last for several weeks.  Home care  · If symptoms are severe, rest at home for the first 2 to 3 days. When you go back to your usual activities, don't let yourself get too tired.  · Do not smoke. Also avoid being exposed to secondhand smoke.  · You may use over-the-counter medicine to control fever or pain, unless another pain medicine was prescribed. (Note: If you have chronic liver or kidney disease or have ever had a stomach ulcer or gastrointestinal bleeding, talk with your healthcare provider before using these medicines. Also talk to your provider if you are taking medicine to prevent blood clots.) Aspirin should never be given to anyone younger than 18 years of age who is ill with a viral infection or fever. It may cause severe liver or brain damage.  · Your appetite may be poor, so a light diet is fine. Avoid dehydration by drinking 6 to 8 glasses of fluids per day (such as water, soft drinks, sports drinks, juices, tea, or soup). Extra fluids will help loosen secretions in the nose and lungs.  · Over-the-counter cough, cold, and sore-throat medicines will not shorten the length of the illness, but they may help to reduce symptoms. (Note: Do not use decongestants if you have high blood pressure.)  Follow-up care  Follow up with your healthcare provider, or as advised. If you had an X-ray or ECG (electrocardiogram), a specialist will  review it. You will be notified of any new findings that may affect your care.  Note: If you are age 65 or older, or if you have a chronic lung disease or condition that affects your immune system, or you smoke, talk to your healthcare provider about having pneumococcal vaccinations and a yearly influenza vaccination (flu shot).  When to seek medical advice  Call your healthcare provider right away if any of these occur:  · Fever of 100.4°F (38°C) or higher  · Coughing up increased amounts of colored sputum  · Weakness, drowsiness, headache, facial pain, ear pain, or a stiff neck  Call 911, or get immediate medical care  Contact emergency services right away if any of these occur:  · Coughing up blood  · Worsening weakness, drowsiness, headache, or stiff neck  · Trouble breathing, wheezing, or pain with breathing  Date Last Reviewed: 9/13/2015  © 0206-6789 [employer]. [address] [place] [address], [place] [id number]. All rights reserved. This information is not intended as a substitute for professional medical care. Always follow your healthcare professional's instructions.

## 2019-10-04 NOTE — PATIENT INSTRUCTIONS
If your condition worsens or fails to improve we recommend that you receive another evaluation at the ER immediately or contact your PCP to discuss your concerns or return here. You must understand that you've received an urgent care treatment only and that you may be released before all your medical problems are known or treated. You the patient will arrange for followup care as instructed.    If we discussed your illness is viral, it will likely last 5-7 days; however, the cough can linger for several weeks.     Rest and fluids are important  Can use honey with rose mary to soothe your throat    You received a steroid prescription today -  Steroids can elevate your blood pressure, elevate your blood sugar, water weight gain, nervous energy, redness to the face.    Take inhaler as prescribed and needed for wheezing  Take prescription cough medication (pills) as prescribed as needed for cough.   Take mucinex as directed to help with chest congestion    -  Flonase (fluticasone) is a nasal spray which is available over the counter and may help with symptoms of nasal congestion.     -  Tylenol or ibuprofen can also be used as directed for pain unless you have an allergy to them or medical condition such as stomach ulcers, kidney or liver disease or blood thinners etc for which you should not be taking these type of medications.     Please follow up with your primary care doctor or specialist in the next 48-72hrs as needed and if no improvement.    If you  smoke, please stop smoking.        Bronchitis, Viral (Adult)    You have a viral bronchitis. Bronchitis is inflammation and swelling of the lining of the lungs. This is often caused by an infection. Symptoms include a dry, hacking cough that is worse at night. The cough may bring up yellow-green mucus. You may also feel short of breath or wheeze. Other symptoms may include tiredness, chest discomfort, and chills.  Bronchitis that is caused by a virus is not treated  with antibiotics. Instead, medicines may be given to help relieve symptoms. Symptoms can last up to 2 weeks, although the cough may last much longer.  This illness is contagious during the first few days and is spread through the air by coughing and sneezing, or by direct contact (touching the sick person and then touching your own eyes, nose, or mouth).  Most viral illnesses resolve within 10 to 14 days with rest and simple home remedies, although they may sometimes last for several weeks.  Home care  · If symptoms are severe, rest at home for the first 2 to 3 days. When you go back to your usual activities, don't let yourself get too tired.  · Do not smoke. Also avoid being exposed to secondhand smoke.  · You may use over-the-counter medicine to control fever or pain, unless another pain medicine was prescribed. (Note: If you have chronic liver or kidney disease or have ever had a stomach ulcer or gastrointestinal bleeding, talk with your healthcare provider before using these medicines. Also talk to your provider if you are taking medicine to prevent blood clots.) Aspirin should never be given to anyone younger than 18 years of age who is ill with a viral infection or fever. It may cause severe liver or brain damage.  · Your appetite may be poor, so a light diet is fine. Avoid dehydration by drinking 6 to 8 glasses of fluids per day (such as water, soft drinks, sports drinks, juices, tea, or soup). Extra fluids will help loosen secretions in the nose and lungs.  · Over-the-counter cough, cold, and sore-throat medicines will not shorten the length of the illness, but they may help to reduce symptoms. (Note: Do not use decongestants if you have high blood pressure.)  Follow-up care  Follow up with your healthcare provider, or as advised. If you had an X-ray or ECG (electrocardiogram), a specialist will review it. You will be notified of any new findings that may affect your care.  Note: If you are age 65 or older,  or if you have a chronic lung disease or condition that affects your immune system, or you smoke, talk to your healthcare provider about having pneumococcal vaccinations and a yearly influenza vaccination (flu shot).  When to seek medical advice  Call your healthcare provider right away if any of these occur:  · Fever of 100.4°F (38°C) or higher  · Coughing up increased amounts of colored sputum  · Weakness, drowsiness, headache, facial pain, ear pain, or a stiff neck  Call 911, or get immediate medical care  Contact emergency services right away if any of these occur:  · Coughing up blood  · Worsening weakness, drowsiness, headache, or stiff neck  · Trouble breathing, wheezing, or pain with breathing  Date Last Reviewed: 9/13/2015  © 0384-4369 "[employer]". [address] [place] [address], [place] [address]. All rights reserved. This information is not intended as a substitute for professional medical care. Always follow your healthcare professional's instructions.

## 2020-01-12 ENCOUNTER — OFFICE VISIT (OUTPATIENT)
Dept: URGENT CARE | Facility: CLINIC | Age: 20
End: 2020-01-12
Payer: COMMERCIAL

## 2020-01-12 VITALS
HEIGHT: 68 IN | RESPIRATION RATE: 16 BRPM | DIASTOLIC BLOOD PRESSURE: 70 MMHG | SYSTOLIC BLOOD PRESSURE: 111 MMHG | TEMPERATURE: 98 F | OXYGEN SATURATION: 100 % | WEIGHT: 132 LBS | HEART RATE: 66 BPM | BODY MASS INDEX: 20 KG/M2

## 2020-01-12 DIAGNOSIS — J32.9 BACTERIAL SINUSITIS: Primary | ICD-10-CM

## 2020-01-12 DIAGNOSIS — B96.89 BACTERIAL SINUSITIS: Primary | ICD-10-CM

## 2020-01-12 PROCEDURE — 99214 PR OFFICE/OUTPT VISIT, EST, LEVL IV, 30-39 MIN: ICD-10-PCS | Mod: S$GLB,,, | Performed by: FAMILY MEDICINE

## 2020-01-12 PROCEDURE — 99214 OFFICE O/P EST MOD 30 MIN: CPT | Mod: S$GLB,,, | Performed by: FAMILY MEDICINE

## 2020-01-12 RX ORDER — BENZONATATE 200 MG/1
200 CAPSULE ORAL 3 TIMES DAILY PRN
Qty: 30 CAPSULE | Refills: 0 | Status: SHIPPED | OUTPATIENT
Start: 2020-01-12 | End: 2021-01-19

## 2020-01-12 RX ORDER — AMOXICILLIN AND CLAVULANATE POTASSIUM 875; 125 MG/1; MG/1
1 TABLET, FILM COATED ORAL EVERY 12 HOURS
Qty: 14 TABLET | Refills: 0 | Status: SHIPPED | OUTPATIENT
Start: 2020-01-12 | End: 2020-01-19

## 2020-01-12 NOTE — LETTER
January 12, 2020      [facility] [address] [facility] [facility] [address]  Phone: [phone]  Fax: [phone]       Patient: Claudette claudette Gilhespie   YOB: 2000  Date of Visit: 01/12/2020    To Whom It May Concern:    claudette Gilhespie  was at [facility] on 01/12/2020. She may return to work/school on 1/15/20 with no restrictions. If you have any questions or concerns, or if I can be of further assistance, please do not hesitate to contact me.    Sincerely,      Stephen Reddy NP

## 2020-01-12 NOTE — PROGRESS NOTES
"Subjective:       Patient ID: Claudette Carolena Gilhespie is a 19 y.o. female.    Vitals:  height is 5' 8" (1.727 m) and weight is 59.9 kg (132 lb). Her temperature is 97.5 °F (36.4 °C). Her blood pressure is 111/70 and her pulse is 66. Her respiration is 16 and oxygen saturation is 100%.     Chief Complaint: Cough    Pt states sinus and chest congestion with cough and sore throat x 1 week with body aches x 2 days. Feels she is worsening. Sinus pressure R>L. Has been taking mucienx.     Cough   This is a new problem. The current episode started in the past 7 days. The problem has been gradually worsening. The cough is productive of sputum. Associated symptoms include myalgias, nasal congestion and a sore throat. Pertinent negatives include no chest pain, chills, fever, headaches, rash or shortness of breath. Nothing aggravates the symptoms. Treatments tried: mucinex,  The treatment provided mild relief. Her past medical history is significant for bronchitis.       Constitution: Negative for chills, fatigue and fever.   HENT: Positive for congestion, sinus pain, sinus pressure and sore throat.    Neck: Negative for painful lymph nodes.   Cardiovascular: Negative for chest pain and leg swelling.   Eyes: Negative for double vision and blurred vision.   Respiratory: Positive for cough and sputum production. Negative for shortness of breath.    Gastrointestinal: Negative for nausea, vomiting and diarrhea.   Genitourinary: Negative for dysuria, frequency, urgency and history of kidney stones.   Musculoskeletal: Positive for muscle ache. Negative for joint pain, joint swelling and muscle cramps.   Skin: Negative for color change, pale, rash and bruising.   Allergic/Immunologic: Negative for seasonal allergies.   Neurological: Negative for dizziness, history of vertigo, light-headedness, passing out and headaches.   Hematologic/Lymphatic: Negative for swollen lymph nodes.   Psychiatric/Behavioral: Negative for " nervous/anxious, sleep disturbance and depression. The patient is not nervous/anxious.        Objective:      Physical Exam   Constitutional: She is oriented to person, place, and time. She appears well-developed and well-nourished. She is cooperative.  Non-toxic appearance. She does not have a sickly appearance. She does not appear ill. No distress.   HENT:   Head: Normocephalic and atraumatic.   Right Ear: Hearing, tympanic membrane, external ear and ear canal normal.   Left Ear: Hearing, tympanic membrane, external ear and ear canal normal.   Nose: Mucosal edema present. No rhinorrhea or nasal deformity. No epistaxis. Right sinus exhibits no maxillary sinus tenderness and no frontal sinus tenderness. Left sinus exhibits no maxillary sinus tenderness and no frontal sinus tenderness.   Mouth/Throat: Uvula is midline, oropharynx is clear and moist and mucous membranes are normal. No trismus in the jaw. Normal dentition. No uvula swelling. No oropharyngeal exudate, posterior oropharyngeal edema or posterior oropharyngeal erythema.   Maxillary sinus pressure R>L   Eyes: Conjunctivae and lids are normal. No scleral icterus.   Neck: Trachea normal, full passive range of motion without pain and phonation normal. Neck supple. No neck rigidity. No edema and no erythema present.   Cardiovascular: Normal rate, regular rhythm, normal heart sounds, intact distal pulses and normal pulses.   Pulmonary/Chest: Effort normal and breath sounds normal. No accessory muscle usage. No tachypnea. No respiratory distress. She has no decreased breath sounds. She has no wheezes. She has no rhonchi. She has no rales.   Abdominal: Normal appearance.   Musculoskeletal: Normal range of motion. She exhibits no edema or deformity.   Neurological: She is alert and oriented to person, place, and time. She exhibits normal muscle tone. Coordination normal.   Skin: Skin is warm, dry, intact, not diaphoretic and not pale.   Psychiatric: She has a  normal mood and affect. Her speech is normal and behavior is normal. Judgment and thought content normal. Cognition and memory are normal.   Nursing note and vitals reviewed.        Assessment:       1. Bacterial sinusitis        Plan:         Bacterial sinusitis  -     amoxicillin-clavulanate 875-125mg (AUGMENTIN) 875-125 mg per tablet; Take 1 tablet by mouth every 12 (twelve) hours. for 7 days  Dispense: 14 tablet; Refill: 0  -     benzonatate (TESSALON) 200 MG capsule; Take 1 capsule (200 mg total) by mouth 3 (three) times daily as needed for Cough.  Dispense: 30 capsule; Refill: 0      Patient Instructions   PLEASE READ YOUR DISCHARGE INSTRUCTIONS ENTIRELY AS IT CONTAINS IMPORTANT INFORMATION.      Please drink plenty of fluids.    Please get plenty of rest.    Please return here or go to the Emergency Department for any concerns or worsening of condition.    Can continue mucinex    If not allergic, please take over the counter Tylenol (Acetaminophen) and/or Motrin (Ibuprofen) as directed for control of pain and/or fever.  Please follow up with your primary care doctor or specialist as needed.    Sore throat recommendations: Warm fluids, warm salt water gargles, throat lozenges, tea, honey, soup, rest, hydration.    Use over the counter flonase: one spray each nostril twice daily OR two sprays each nostril once daily.     Sinus rinses DO NOT USE TAP WATER, if you must, water must be a rolling boil for 1 minute, let it cool, then use.  May use distilled water, or over the counter nasal saline rinses.  Vics vapor rub in shower to help open nasal passages.  May use nasal gel to keep passages moisturized.  May use Nasal saline sprays during the day for added relief of congestion.   For those who go to the gym, please do not use the sauna or steam room now to clear sinuses.    If you  smoke, please stop smoking.      Please return or see your primary care doctor if you develop new or worsening symptoms.     Please  arrange follow up with your primary medical clinic as soon as possible. You must understand that you've received an Urgent Care treatment only and that you may be released before all of your medical problems are known or treated. You, the patient, will arrange for follow up as instructed. If your symptoms worsen or fail to improve you should go to the Emergency Room.    Sinusitis (Antibiotic Treatment)    The sinuses are air-filled spaces within the bones of the face. They connect to the inside of the nose. Sinusitis is an inflammation of the tissue lining the sinus cavity. Sinus inflammation can occur during a cold. It can also be due to allergies to pollens and other particles in the air. Sinusitis can cause symptoms of sinus congestion and fullness. A sinus infection causes fever, headache and facial pain. There is often green or yellow drainage from the nose or into the back of the throat (post-nasal drip). You have been given antibiotics to treat this condition.  Home care:  · Take the full course of antibiotics as instructed. Do not stop taking them, even if you feel better.  · Drink plenty of water, hot tea, and other liquids. This may help thin mucus. It also may promote sinus drainage.  · Heat may help soothe painful areas of the face. Use a towel soaked in hot water. Or,  the shower and direct the hot spray onto your face. Using a vaporizer along with a menthol rub at night may also help.   · An expectorant containing guaifenesin may help thin the mucus and promote drainage from the sinuses.  · Over-the-counter decongestants may be used unless a similar medicine was prescribed. Nasal sprays work the fastest. Use one that contains phenylephrine or oxymetazoline. First blow the nose gently. Then use the spray. Do not use these medicines more often than directed on the label or symptoms may get worse. You may also use tablets containing pseudoephedrine. Avoid products that combine ingredients, because  side effects may be increased. Read labels. You can also ask the pharmacist for help. (NOTE: Persons with high blood pressure should not use decongestants. They can raise blood pressure.)  · Over-the-counter antihistamines may help if allergies contributed to your sinusitis.    · Do not use nasal rinses or irrigation during an acute sinus infection, unless told to by your health care provider. Rinsing may spread the infection to other sinuses.  · Use acetaminophen or ibuprofen to control pain, unless another pain medicine was prescribed. (If you have chronic liver or kidney disease or ever had a stomach ulcer, talk with your doctor before using these medicines. Aspirin should never be used in anyone under 18 years of age who is ill with a fever. It may cause severe liver damage.)  · Don't smoke. This can worsen symptoms.  Follow-up care  Follow up with your healthcare provider or our staff if you are not improving within the next week.  When to seek medical advice  Call your healthcare provider if any of these occur:  · Facial pain or headache becoming more severe  · Stiff neck  · Unusual drowsiness or confusion  · Swelling of the forehead or eyelids  · Vision problems, including blurred or double vision  · Fever of 100.4ºF (38ºC) or higher, or as directed by your healthcare provider  · Seizure  · Breathing problems  · Symptoms not resolving within 10 days  Date Last Reviewed: 4/13/2015  © 1660-0088 [employer]. [address] [place] [address], [place] [address]. All rights reserved. This information is not intended as a substitute for professional medical care. Always follow your healthcare professional's instructions.

## 2020-02-28 ENCOUNTER — OFFICE VISIT (OUTPATIENT)
Dept: URGENT CARE | Facility: CLINIC | Age: 20
End: 2020-02-28
Payer: COMMERCIAL

## 2020-02-28 VITALS
DIASTOLIC BLOOD PRESSURE: 67 MMHG | SYSTOLIC BLOOD PRESSURE: 130 MMHG | TEMPERATURE: 98 F | WEIGHT: 132 LBS | RESPIRATION RATE: 18 BRPM | HEIGHT: 68 IN | OXYGEN SATURATION: 99 % | BODY MASS INDEX: 20 KG/M2 | HEART RATE: 70 BPM

## 2020-02-28 DIAGNOSIS — R30.0 DYSURIA: ICD-10-CM

## 2020-02-28 DIAGNOSIS — N30.01 ACUTE CYSTITIS WITH HEMATURIA: Primary | ICD-10-CM

## 2020-02-28 LAB
B-HCG UR QL: NEGATIVE
BILIRUB UR QL STRIP: NEGATIVE
CTP QC/QA: YES
GLUCOSE UR QL STRIP: NEGATIVE
KETONES UR QL STRIP: NEGATIVE
LEUKOCYTE ESTERASE UR QL STRIP: NEGATIVE
PH, POC UA: 5.5
POC BLOOD, URINE: POSITIVE
POC NITRATES, URINE: NEGATIVE
PROT UR QL STRIP: NEGATIVE
SP GR UR STRIP: 1.01 (ref 1–1.03)
UROBILINOGEN UR STRIP-ACNC: NORMAL (ref 0.1–1.1)

## 2020-02-28 PROCEDURE — 99214 OFFICE O/P EST MOD 30 MIN: CPT | Mod: 25,S$GLB,, | Performed by: NURSE PRACTITIONER

## 2020-02-28 PROCEDURE — 81003 URINALYSIS AUTO W/O SCOPE: CPT | Mod: QW,S$GLB,, | Performed by: NURSE PRACTITIONER

## 2020-02-28 PROCEDURE — 81025 URINE PREGNANCY TEST: CPT | Mod: S$GLB,,, | Performed by: NURSE PRACTITIONER

## 2020-02-28 PROCEDURE — 99214 PR OFFICE/OUTPT VISIT, EST, LEVL IV, 30-39 MIN: ICD-10-PCS | Mod: 25,S$GLB,, | Performed by: NURSE PRACTITIONER

## 2020-02-28 PROCEDURE — 81025 POCT URINE PREGNANCY: ICD-10-PCS | Mod: S$GLB,,, | Performed by: NURSE PRACTITIONER

## 2020-02-28 PROCEDURE — 81003 POCT URINALYSIS, DIPSTICK, AUTOMATED, W/O SCOPE: ICD-10-PCS | Mod: QW,S$GLB,, | Performed by: NURSE PRACTITIONER

## 2020-02-28 RX ORDER — SULFAMETHOXAZOLE AND TRIMETHOPRIM 800; 160 MG/1; MG/1
1 TABLET ORAL 2 TIMES DAILY
Qty: 14 TABLET | Refills: 0 | Status: SHIPPED | OUTPATIENT
Start: 2020-02-28 | End: 2020-03-02

## 2020-02-28 RX ORDER — PHENAZOPYRIDINE HYDROCHLORIDE 200 MG/1
200 TABLET, FILM COATED ORAL 3 TIMES DAILY PRN
Qty: 6 TABLET | Refills: 0 | Status: SHIPPED | OUTPATIENT
Start: 2020-02-28 | End: 2020-03-01

## 2020-02-28 NOTE — PATIENT INSTRUCTIONS
"As discussed... It was advised to get a urine culture, if your symptoms do not resolve with antibiotic use, please get rechecked urgently.                                                                        UTI   If your condition worsens or fails to improve we recommend that you receive another evaluation at the ER immediately or contact your PCP to discuss your concerns or return here. You must understand that you've received an urgent care treatment only and that you may be released before all your medical problems are known or treated. You the patient will arrange for followup care as instructed.   If you had cultures done it will take 3-5 days to result. We will call you with the result.   If you are are female and on BCP use additional methods to prevent pregnancy while on the antibiotics and for one cycle after.   Cranberry juice may help. Get the 100% cranberry juice and mix 4 oz of juice with 4 oz of water and drink this 8 oz glass of liquid once a day.   Increase water intake to at least 8-10 glasses/day.  Do not wear contacts with Pyridium as it will stain them.  You may want to wear a panty liner with Pyridium as it may stain your underwear.  Avoid caffeine, alcohol, or spicy foods as they irritate the bladder.        Bladder Infection, Female (Adult)    Urine is normally doesn't have any bacteria in it. But bacteria can get into the urinary tract from the skin around the rectum. Or they can travel in the blood from elsewhere in the body. Once they are in your urinary tract, they can cause infection in the urethra (urethritis), the bladder (cystitis), or the kidneys (pyelonephritis).  The most common place for an infection is in the bladder. This is called a bladder infection. This is one of the most common infections in women. Most bladder infections are easily treated. They are not serious unless the infection spreads to the kidney.  The phrases "bladder infection," "UTI," and "cystitis" are often " used to describe the same thing. But they are not always the same. Cystitis is an inflammation of the bladder. The most common cause of cystitis is an infection.  Symptoms  The infection causes inflammation in the urethra and bladder. This causes many of the symptoms. The most common symptoms of a bladder infection are:  · Pain or burning when urinating  · Having to urinate more often than usual  · Urgent need to urinate  · Only a small amount of urine comes out  · Blood in urine  · Abdominal discomfort. This is usually in the lower abdomen above the pubic bone.  · Cloudy urine  · Strong- or bad-smelling urine  · Unable to urinate (urinary retention)  · Unable to hold urine in (urinary incontinence)  · Fever  · Loss of appetite  · Confusion (in older adults)  Causes  Bladder infections are not contagious. You can't get one from someone else, from a toilet seat, or from sharing a bath.  The most common cause of bladder infections is bacteria from the bowels. The bacteria get onto the skin around the opening of the urethra. From there, they can get into the urine and travel up to the bladder, causing inflammation and infection. This usually happens because of:  · Wiping improperly after urinating. Always wipe from front to back.  · Bowel incontinence  · Pregnancy  · Procedures such as having a catheter inserted  · Older age  · Not emptying your bladder. This can allow bacteria a chance to grow in your urine.  · Dehydration  · Constipation  · Sex  · Use of a diaphragm for birth control   Treatment  Bladder infections are diagnosed by a urine test. They are treated with antibiotics and usually clear up quickly without complications. Treatment helps prevent a more serious kidney infection.  Medicines  Medicines can help in the treatment of a bladder infection:  · Take antibiotics until they are used up, even if you feel better. It is important to finish them to make sure the infection has cleared.  · You can use  acetaminophen or ibuprofen for pain, fever, or discomfort, unless another medicine was prescribed. If you have chronic liver or kidney disease, talk with your healthcare provider before using these medicines. Also talk with your provider if you've ever had a stomach ulcer or gastrointestinal bleeding, or are taking blood-thinner medicines.  · If you are given phenazopydridine to reduce burning with urination, it will cause your urine to become a bright orange color. This can stain clothing.  Care and prevention  These self-care steps can help prevent future infections:  · Drink plenty of fluids to prevent dehydration and flush out your bladder. Do this unless you must restrict fluids for other health reasons, or your doctor told you not to.  · Proper cleaning after going to the bathroom is important. Wipe from front to back after using the toilet to prevent the spread of bacteria.  · Urinate more often. Don't try to hold urine in for a long time.  · Wear loose-fitting clothes and cotton underwear. Avoid tight-fitting pants.  · Improve your diet and prevent constipation. Eat more fresh fruit and vegetables, and fiber, and less junk and fatty foods.  · Avoid sex until your symptoms are gone.  · Avoid caffeine, alcohol, and spicy foods. These can irritate your bladder.  · Urinate right after intercourse to flush out your bladder.  · If you use birth control pills and have frequent bladder infections, discuss it with your doctor.  Follow-up care  Call your healthcare provider if all symptoms are not gone after 3 days of treatment. This is especially important if you have repeat infections.  If a culture was done, you will be told if your treatment needs to be changed. If directed, you can call to find out the results.  If X-rays were done, you will be told if the results will affect your treatment.  Call 911  Call 911 if any of the following occur:  · Trouble breathing  · Hard to wake up or confusion  · Fainting or loss  of consciousness  · Rapid heart rate  When to seek medical advice  Call your healthcare provider right away if any of these occur:  · Fever of 100.4ºF (38.0ºC) or higher, or as directed by your healthcare provider  · Symptoms are not better by the third day of treatment  · Back or belly (abdominal) pain that gets worse  · Repeated vomiting, or unable to keep medicine down  · Weakness or dizziness  · Vaginal discharge  · Pain, redness, or swelling in the outer vaginal area (labia)  Date Last Reviewed: 10/1/2016  © 2151-8666 [employer]. [address] [place] [address]. All rights reserved. This information is not intended as a substitute for professional medical care. Always follow your healthcare professional's instructions.

## 2020-02-28 NOTE — PROGRESS NOTES
"Subjective:       Patient ID: Claudette Carolena Gilhespie is a 19 y.o. female.    Vitals:  height is 5' 8" (1.727 m) and weight is 59.9 kg (132 lb). Her temperature is 98 °F (36.7 °C). Her blood pressure is 130/67 and her pulse is 70. Her respiration is 18 and oxygen saturation is 99%.     Chief Complaint: Dysuria    Patient presents with complaint of 4-5 days of dysuria with difficulty urinating.  States that last night she started to notice hematuria after she wiped to use the restroom.  Urgency and frequency associated.  States that it feels similar to a previous UTI.  She also started to get bilateral lower back dull pain.  She is concerned because she had a kidney infection a few years ago that led to hospitalization because she let it go too long.  We discussed the results of her urinalysis and I advised her to get a urine culture.  She is refusing urine culture because she is scared that her insurance will not cover it and her family lives in the  and cannot afford this at this time.  She is not concerned with any STDs.  Denies any abdominal or pelvic pain. Denies vaginal discharge.    Dysuria    This is a new problem. The current episode started in the past 7 days (5 days now ). The problem occurs every urination. The quality of the pain is described as aching and burning. The pain is at a severity of 4/10. The pain is mild. There has been no fever. Associated symptoms include frequency and urgency. Pertinent negatives include no behavior changes, chills, discharge, flank pain, hematuria, hesitancy, nausea, possible pregnancy, sweats, vomiting, weight loss, bubble bath use, constipation, rash or withholding. She has tried nothing for the symptoms. Her past medical history is significant for kidney stones (Patient states that this does not feel similar.). There is no history of hypertension, recurrent UTIs, a single kidney or STD.       Constitution: Negative for chills and fever.   Neck: Negative for " painful lymph nodes.   Gastrointestinal: Negative for abdominal pain, nausea, vomiting and constipation.   Genitourinary: Positive for dysuria, frequency and urgency. Negative for urine decreased, flank pain, hematuria, history of kidney stones, painful menstruation, irregular menstruation, missed menses, heavy menstrual bleeding, ovarian cysts, genital trauma, vaginal pain, vaginal discharge, vaginal bleeding, vaginal odor, painful intercourse, genital sore, painful ejaculation and pelvic pain.   Musculoskeletal: Negative for back pain.   Skin: Negative for rash and lesion.   Hematologic/Lymphatic: Negative for swollen lymph nodes.       Objective:      Physical Exam   Constitutional: She is oriented to person, place, and time. She appears well-developed and well-nourished. No distress.   HENT:   Head: Normocephalic and atraumatic.   Right Ear: External ear normal.   Left Ear: External ear normal.   Nose: Nose normal. No nasal deformity. No epistaxis.   Mouth/Throat: Oropharynx is clear and moist and mucous membranes are normal.   Eyes: Lids are normal.   Neck: Trachea normal, normal range of motion and phonation normal. Neck supple.   Cardiovascular: Normal pulses.   Pulmonary/Chest: Effort normal.   Abdominal: Soft. Normal appearance and bowel sounds are normal. She exhibits no distension. There is tenderness in the suprapubic area. There is no rigidity, no rebound, no guarding and no CVA tenderness.   Mild discomfort over bladder to palpation.   Neurological: She is alert and oriented to person, place, and time.   Skin: Skin is warm, dry, intact and not diaphoretic.   Psychiatric: She has a normal mood and affect. Her speech is normal and behavior is normal. Cognition and memory are normal.   Nursing note and vitals reviewed.    Results for orders placed or performed in visit on 02/28/20   POCT Urinalysis, Dipstick, Automated, W/O Scope   Result Value Ref Range    POC Blood, Urine Positive (A) Negative    POC  Bilirubin, Urine Negative Negative    POC Urobilinogen, Urine NORMAL 0.1 - 1.1    POC Ketones, Urine Negative Negative    POC Protein, Urine Negative Negative    POC Nitrates, Urine Negative Negative    POC Glucose, Urine Negative Negative    pH, UA 5.5     POC Specific Gravity, Urine 1.010 1.003 - 1.029    POC Leukocytes, Urine Negative Negative   POCT urine pregnancy   Result Value Ref Range    POC Preg Test, Ur Negative Negative     Acceptable Yes          Assessment:       1. Acute cystitis with hematuria    2. Dysuria        Plan:         Acute cystitis with hematuria  -     sulfamethoxazole-trimethoprim 800-160mg (BACTRIM DS) 800-160 mg Tab; Take 1 tablet by mouth 2 (two) times daily. for 7 days  Dispense: 14 tablet; Refill: 0    Dysuria  -     POCT Urinalysis, Dipstick, Automated, W/O Scope  -     POCT urine pregnancy  -     phenazopyridine (PYRIDIUM) 200 MG tablet; Take 1 tablet (200 mg total) by mouth 3 (three) times daily as needed for Pain.  Dispense: 6 tablet; Refill: 0      Patient Instructions   As discussed... It was advised to get a urine culture, if your symptoms do not resolve with antibiotic use, please get rechecked urgently.                                                                        UTI   If your condition worsens or fails to improve we recommend that you receive another evaluation at the ER immediately or contact your PCP to discuss your concerns or return here. You must understand that you've received an urgent care treatment only and that you may be released before all your medical problems are known or treated. You the patient will arrange for followup care as instructed.   If you had cultures done it will take 3-5 days to result. We will call you with the result.   If you are are female and on BCP use additional methods to prevent pregnancy while on the antibiotics and for one cycle after.   Cranberry juice may help. Get the 100% cranberry juice and mix 4 oz of  "juice with 4 oz of water and drink this 8 oz glass of liquid once a day.   Increase water intake to at least 8-10 glasses/day.  Do not wear contacts with Pyridium as it will stain them.  You may want to wear a panty liner with Pyridium as it may stain your underwear.  Avoid caffeine, alcohol, or spicy foods as they irritate the bladder.        Bladder Infection, Female (Adult)    Urine is normally doesn't have any bacteria in it. But bacteria can get into the urinary tract from the skin around the rectum. Or they can travel in the blood from elsewhere in the body. Once they are in your urinary tract, they can cause infection in the urethra (urethritis), the bladder (cystitis), or the kidneys (pyelonephritis).  The most common place for an infection is in the bladder. This is called a bladder infection. This is one of the most common infections in women. Most bladder infections are easily treated. They are not serious unless the infection spreads to the kidney.  The phrases "bladder infection," "UTI," and "cystitis" are often used to describe the same thing. But they are not always the same. Cystitis is an inflammation of the bladder. The most common cause of cystitis is an infection.  Symptoms  The infection causes inflammation in the urethra and bladder. This causes many of the symptoms. The most common symptoms of a bladder infection are:  · Pain or burning when urinating  · Having to urinate more often than usual  · Urgent need to urinate  · Only a small amount of urine comes out  · Blood in urine  · Abdominal discomfort. This is usually in the lower abdomen above the pubic bone.  · Cloudy urine  · Strong- or bad-smelling urine  · Unable to urinate (urinary retention)  · Unable to hold urine in (urinary incontinence)  · Fever  · Loss of appetite  · Confusion (in older adults)  Causes  Bladder infections are not contagious. You can't get one from someone else, from a toilet seat, or from sharing a bath.  The most " common cause of bladder infections is bacteria from the bowels. The bacteria get onto the skin around the opening of the urethra. From there, they can get into the urine and travel up to the bladder, causing inflammation and infection. This usually happens because of:  · Wiping improperly after urinating. Always wipe from front to back.  · Bowel incontinence  · Pregnancy  · Procedures such as having a catheter inserted  · Older age  · Not emptying your bladder. This can allow bacteria a chance to grow in your urine.  · Dehydration  · Constipation  · Sex  · Use of a diaphragm for birth control   Treatment  Bladder infections are diagnosed by a urine test. They are treated with antibiotics and usually clear up quickly without complications. Treatment helps prevent a more serious kidney infection.  Medicines  Medicines can help in the treatment of a bladder infection:  · Take antibiotics until they are used up, even if you feel better. It is important to finish them to make sure the infection has cleared.  · You can use acetaminophen or ibuprofen for pain, fever, or discomfort, unless another medicine was prescribed. If you have chronic liver or kidney disease, talk with your healthcare provider before using these medicines. Also talk with your provider if you've ever had a stomach ulcer or gastrointestinal bleeding, or are taking blood-thinner medicines.  · If you are given phenazopydridine to reduce burning with urination, it will cause your urine to become a bright orange color. This can stain clothing.  Care and prevention  These self-care steps can help prevent future infections:  · Drink plenty of fluids to prevent dehydration and flush out your bladder. Do this unless you must restrict fluids for other health reasons, or your doctor told you not to.  · Proper cleaning after going to the bathroom is important. Wipe from front to back after using the toilet to prevent the spread of bacteria.  · Urinate more often.  Don't try to hold urine in for a long time.  · Wear loose-fitting clothes and cotton underwear. Avoid tight-fitting pants.  · Improve your diet and prevent constipation. Eat more fresh fruit and vegetables, and fiber, and less junk and fatty foods.  · Avoid sex until your symptoms are gone.  · Avoid caffeine, alcohol, and spicy foods. These can irritate your bladder.  · Urinate right after intercourse to flush out your bladder.  · If you use birth control pills and have frequent bladder infections, discuss it with your doctor.  Follow-up care  Call your healthcare provider if all symptoms are not gone after 3 days of treatment. This is especially important if you have repeat infections.  If a culture was done, you will be told if your treatment needs to be changed. If directed, you can call to find out the results.  If X-rays were done, you will be told if the results will affect your treatment.  Call 911  Call 911 if any of the following occur:  · Trouble breathing  · Hard to wake up or confusion  · Fainting or loss of consciousness  · Rapid heart rate  When to seek medical advice  Call your healthcare provider right away if any of these occur:  · Fever of 100.4ºF (38.0ºC) or higher, or as directed by your healthcare provider  · Symptoms are not better by the third day of treatment  · Back or belly (abdominal) pain that gets worse  · Repeated vomiting, or unable to keep medicine down  · Weakness or dizziness  · Vaginal discharge  · Pain, redness, or swelling in the outer vaginal area (labia)  Date Last Reviewed: 10/1/2016  © 7270-0165 [employer]. [address] [place] [address], [place] [address]. All rights reserved. This information is not intended as a substitute for professional medical care. Always follow your healthcare professional's instructions.

## 2020-03-02 ENCOUNTER — OFFICE VISIT (OUTPATIENT)
Dept: URGENT CARE | Facility: CLINIC | Age: 20
End: 2020-03-02
Payer: COMMERCIAL

## 2020-03-02 VITALS
BODY MASS INDEX: 20 KG/M2 | HEART RATE: 60 BPM | SYSTOLIC BLOOD PRESSURE: 119 MMHG | OXYGEN SATURATION: 99 % | TEMPERATURE: 98 F | WEIGHT: 132 LBS | RESPIRATION RATE: 18 BRPM | HEIGHT: 68 IN | DIASTOLIC BLOOD PRESSURE: 63 MMHG

## 2020-03-02 DIAGNOSIS — R10.9 FLANK PAIN: ICD-10-CM

## 2020-03-02 DIAGNOSIS — N39.0 URINARY TRACT INFECTION WITHOUT HEMATURIA, SITE UNSPECIFIED: Primary | ICD-10-CM

## 2020-03-02 DIAGNOSIS — N12 PYELONEPHRITIS: ICD-10-CM

## 2020-03-02 LAB
B-HCG UR QL: NEGATIVE
BILIRUB UR QL STRIP: POSITIVE
CTP QC/QA: YES
GLUCOSE UR QL STRIP: NEGATIVE
KETONES UR QL STRIP: NEGATIVE
LEUKOCYTE ESTERASE UR QL STRIP: NEGATIVE
PH, POC UA: 5 (ref 5–8)
POC BLOOD, URINE: NEGATIVE
POC NITRATES, URINE: POSITIVE
PROT UR QL STRIP: NEGATIVE
SP GR UR STRIP: 1.01 (ref 1–1.03)
UROBILINOGEN UR STRIP-ACNC: ABNORMAL (ref 0.1–1.1)

## 2020-03-02 PROCEDURE — 87086 URINE CULTURE/COLONY COUNT: CPT

## 2020-03-02 PROCEDURE — 81025 URINE PREGNANCY TEST: CPT | Mod: S$GLB,,, | Performed by: PHYSICIAN ASSISTANT

## 2020-03-02 PROCEDURE — 81003 POCT URINALYSIS, DIPSTICK, AUTOMATED, W/O SCOPE: ICD-10-PCS | Mod: QW,S$GLB,, | Performed by: PHYSICIAN ASSISTANT

## 2020-03-02 PROCEDURE — 99499 UNLISTED E&M SERVICE: CPT | Mod: S$GLB,,, | Performed by: PHYSICIAN ASSISTANT

## 2020-03-02 PROCEDURE — 99499 NO LOS: ICD-10-PCS | Mod: S$GLB,,, | Performed by: PHYSICIAN ASSISTANT

## 2020-03-02 PROCEDURE — 81025 POCT URINE PREGNANCY: ICD-10-PCS | Mod: S$GLB,,, | Performed by: PHYSICIAN ASSISTANT

## 2020-03-02 PROCEDURE — 81003 URINALYSIS AUTO W/O SCOPE: CPT | Mod: QW,S$GLB,, | Performed by: PHYSICIAN ASSISTANT

## 2020-03-02 RX ORDER — CIPROFLOXACIN 500 MG/1
500 TABLET ORAL 2 TIMES DAILY
Qty: 14 TABLET | Refills: 0 | Status: SHIPPED | OUTPATIENT
Start: 2020-03-02 | End: 2020-03-09

## 2020-03-02 NOTE — PATIENT INSTRUCTIONS
"  Kidney Infection (Adult, Female)    An infection in one or both kidneys is called "pyelonephritis." It usually happens when bacteria (or rarely, viruses, fungi, or other disease-causing organisms) get into the kidney. The bacteria (or other disease-causing organisms) can enter the kidneys from the bladder or blood traveling from other parts of the body. A kidney infection can become serious. It can cause severe illness, scarring of the kidneys, or kidney failure if not treated properly.  Common causes for this problem include:  · Not keeping the genital area clean and dry, which promotes the growth of bacteria  · Wiping back to front which drags bacteria from the rectum toward the urinary opening (urethra)  · Wearing tight pants or underwear (this lets moisture build up in the genital area, which helps bacteria grow)  · Holding urine in for long periods of time  · Dehydration  Kidney infections can cause symptoms similar to a bladder infection. Symptoms include:  · Pain (or burning) when urinating  · Having to urinate more often than usual  · Blood in the urine (pink or red)  · Abdominal pain or discomfort, usually in the lower abdomen  · Pain in the side or back  · Pain above the pubic bone  · Fever or chills  · Vomiting  · Loss of appetite  Treatment is oral antibiotics, or in more severe cases, intramuscular or IV antibiotics. These are started right away and may be changed once urine culture results determine the infecting organisms. Treatment helps prevent a more serious kidney infection.  Medicines  Medicines can help in the treatment of a bladder infection:  · Take antibiotics until they are used up, even if you feel better. It is important to finish them to make sure the infection is gone.  · Unless another medicine was prescribed, you can use over-the-counter medicines for pain, fever, or discomfort. If you have chronic liver of kidney disease, talk with your healthcare provider before using these " medicines. Also talk with your provider if you've ever had a stomach ulcer or gastrointestinal (GI) bleeding, or are taking blood thinners.  Home care  The following are general care guidelines:  · Stay home from work or school. Rest in bed until your fever breaks and you are feeling better, or as advised by your healthcare provider.  · Drink lots of fluid unless you must restrict fluids for other medical reasons. This will force the medicine into your urinary system and flush the bacteria out of your body. Ask your healthcare provider how much you should drink.  · Avoid sex until you have finished all of your medicine and your symptoms are gone.  · Avoid caffeine, alcohol, and spicy foods. These foods may irritate the kidneys and bladder.  · Avoid taking bubble baths. Sensitivity to the chemicals in bubble baths can irritate the urethra.  · Make sure you wipe from front to back after using the toilet.  · Wear loose cloths and cotton underwear.  Prevention  These self-care steps can help prevent future infections:  · Drink plenty of fluids to prevent dehydration and flush out the bladder. Do this unless you must restrict fluids for other health reasons, or your healthcare provider told you not to.  · Proper cleaning after going to the bathroom in important. Make sure you wipe from front to back after using the toilet.  · Urinate more often. Don't try to hold urine in for a long time.  · Avoid tight-fitting pants and underwear.  · Improve your diet to prevent constipation. Eat more fruits, vegetables, and fiber. Eat less junk and fatty foods. Constipation can make a urinary tract infection more likely. Talk with your healthcare provider if you have trouble with bowel movements.  · Urinate right after intercourse to flush out the bladder.  Follow-up care  Follow up with your healthcare provider, or as advised. Additional testing may be needed to make sure the infection has cleared. Close follow-up and further testing  is very important to find the cause and to prevent future infections.  If a urine culture was done, you will be contacted if your treatment needs to be changed. If directed, you may call to find out the results.  If you had an X-ray, CT scan, or other diagnostic test, you will be notified of any new findings that may affect your care.  Call 911  Call 911 if any of the following occur:  · Trouble breathing  · Fainting or loss of consciousness  · Rapid or very slow heart rate  · Weakness, dizziness, or fainting  · Difficulty arousing or confusion  When to seek medical advice  Call your healthcare provider right away if any of these occur:  · Fever 100.4°F (38°C) or higher after 48 hours of treatment, or as advised by your healthcare provider  · Not feeling better within 1 to 2 days after starting antibiotics  · Any symptom that continues after 3 days of treatment  · Increasing pain in the stomach, back, side, or groin area  · Repeated vomiting  · Not able to take prescribed medicine due to nausea or another reason  · Bloody, dark-colored, or foul smelling urine  · Trouble urinating or decreased urine output  · No urine for 8 hours, no tears when crying, sunken eyes, or dry mouth  Date Last Reviewed: 10/1/2016  © 7034-9481 [employer]. [address] [place] [address], [place] [address]. All rights reserved. This information is not intended as a substitute for professional medical care. Always follow your healthcare professional's instructions.      Please follow up with your Primary care provider within 2-5 days if your signs and symptoms have not resolved or worsen.     If your condition worsens or fails to improve we recommend that you receive another evaluation at the emergency room immediately or contact your primary medical clinic to discuss your concerns.   You must understand that you have received an Urgent Care treatment only and that you may be released before all of your medical problems are known or  treated. You, the patient, will arrange for follow up care as instructed.     RED FLAGS/WARNING SYMPTOMS DISCUSSED WITH PATIENT THAT WOULD WARRANT EMERGENT MEDICAL ATTENTION. PATIENT VERBALIZED UNDERSTANDING.

## 2020-03-02 NOTE — PROGRESS NOTES
"Subjective:       Patient ID: Claudette Carolena Gilhespie is a 19 y.o. female.    Vitals:  height is 5' 8" (1.727 m) and weight is 59.9 kg (132 lb). Her temperature is 98.2 °F (36.8 °C). Her blood pressure is 119/63 and her pulse is 60. Her respiration is 18 and oxygen saturation is 99%.     Chief Complaint: Flank Pain    Pt states she went to the doctor Friday morning and they check her urine, which came back with blood in urine. Pt states she has been taking medicine for a UTI. Pt states this morning she woke up with bad back and flake pain. Pt states the pain comes and goes.     Flank Pain   This is a new problem. The current episode started in the past 7 days. The problem occurs constantly. The problem has been gradually worsening since onset. The pain is at a severity of 6/10. The pain is moderate. The symptoms are aggravated by bending, lying down, sitting, standing and position. Pertinent negatives include no abdominal pain, dysuria, fever or pelvic pain. She has tried nothing for the symptoms. The treatment provided no relief.       Constitution: Negative for chills and fever.   Neck: Negative for painful lymph nodes.   Gastrointestinal: Negative for abdominal pain, nausea and vomiting.   Genitourinary: Positive for flank pain. Negative for dysuria, frequency, urgency, urine decreased, hematuria, history of kidney stones, painful menstruation, irregular menstruation, missed menses, heavy menstrual bleeding, ovarian cysts, genital trauma, vaginal pain, vaginal discharge, vaginal bleeding, vaginal odor, painful intercourse, genital sore, painful ejaculation and pelvic pain.   Musculoskeletal: Positive for back pain.   Skin: Negative for rash and lesion.   Hematologic/Lymphatic: Negative for swollen lymph nodes.       Objective:      Physical Exam   Constitutional: She is oriented to person, place, and time. She appears well-developed and well-nourished.   HENT:   Head: Normocephalic and atraumatic.   Right " Ear: External ear normal.   Left Ear: External ear normal.   Nose: Nose normal. No nasal deformity. No epistaxis.   Mouth/Throat: Oropharynx is clear and moist and mucous membranes are normal.   Eyes: Conjunctivae and lids are normal.   Neck: Trachea normal, normal range of motion, full passive range of motion without pain and phonation normal. Neck supple.   Cardiovascular: Normal rate, regular rhythm, normal heart sounds and normal pulses.   Pulmonary/Chest: Effort normal and breath sounds normal. No respiratory distress.   Abdominal: Soft. Normal appearance and bowel sounds are normal. She exhibits no distension, no abdominal bruit, no pulsatile midline mass and no mass. There is no hepatosplenomegaly. There is tenderness in the suprapubic area. There is CVA tenderness (mild bilateral). There is no rigidity, no rebound, no guarding, no tenderness at McBurney's point and negative Cleaning's sign.   Musculoskeletal: Normal range of motion. She exhibits no edema.        Thoracic back: She exhibits normal range of motion, no tenderness, no bony tenderness, no swelling, no edema, no deformity, no laceration, no pain, no spasm and normal pulse.        Lumbar back: She exhibits normal range of motion, no tenderness, no bony tenderness, no swelling, no edema, no deformity, no laceration, no pain, no spasm and normal pulse.   No midline spine tenderness   Neurological: She is alert and oriented to person, place, and time. She has normal strength.   Skin: Skin is warm, dry, intact, not diaphoretic and not pale.   Psychiatric: She has a normal mood and affect. Her speech is normal and behavior is normal. Judgment and thought content normal. Cognition and memory are normal.   Nursing note and vitals reviewed.        Assessment:       1. Urinary tract infection without hematuria, site unspecified    2. Flank pain    3. Pyelonephritis        Plan:         Urinary tract infection without hematuria, site unspecified  -      "ciprofloxacin HCl (CIPRO) 500 MG tablet; Take 1 tablet (500 mg total) by mouth 2 (two) times daily. for 7 days  Dispense: 14 tablet; Refill: 0  -     Urine culture    Flank pain  -     POCT urine pregnancy  -     POCT Urinalysis, Dipstick, Automated, W/O Scope    Pyelonephritis  -     Urine culture     Review lab results with patient.  Discussed positive nitrates in urine consistent with bacteriuria.  Discussed change of antibiotics as Bactrim is likely ineffective.  Discussed importance of obtaining urine culture.   Discussed diagnosis with patient as well as treatment and home care. Discussed return to clinic precautions vs ER precautions. All patients questions answered. Patient verbalized understanding. Patient agreed with plan of care.         Kidney Infection (Adult, Female)    An infection in one or both kidneys is called "pyelonephritis." It usually happens when bacteria (or rarely, viruses, fungi, or other disease-causing organisms) get into the kidney. The bacteria (or other disease-causing organisms) can enter the kidneys from the bladder or blood traveling from other parts of the body. A kidney infection can become serious. It can cause severe illness, scarring of the kidneys, or kidney failure if not treated properly.  Common causes for this problem include:  · Not keeping the genital area clean and dry, which promotes the growth of bacteria  · Wiping back to front which drags bacteria from the rectum toward the urinary opening (urethra)  · Wearing tight pants or underwear (this lets moisture build up in the genital area, which helps bacteria grow)  · Holding urine in for long periods of time  · Dehydration  Kidney infections can cause symptoms similar to a bladder infection. Symptoms include:  · Pain (or burning) when urinating  · Having to urinate more often than usual  · Blood in the urine (pink or red)  · Abdominal pain or discomfort, usually in the lower abdomen  · Pain in the side or back  · Pain " above the pubic bone  · Fever or chills  · Vomiting  · Loss of appetite  Treatment is oral antibiotics, or in more severe cases, intramuscular or IV antibiotics. These are started right away and may be changed once urine culture results determine the infecting organisms. Treatment helps prevent a more serious kidney infection.  Medicines  Medicines can help in the treatment of a bladder infection:  · Take antibiotics until they are used up, even if you feel better. It is important to finish them to make sure the infection is gone.  · Unless another medicine was prescribed, you can use over-the-counter medicines for pain, fever, or discomfort. If you have chronic liver of kidney disease, talk with your healthcare provider before using these medicines. Also talk with your provider if you've ever had a stomach ulcer or gastrointestinal (GI) bleeding, or are taking blood thinners.  Home care  The following are general care guidelines:  · Stay home from work or school. Rest in bed until your fever breaks and you are feeling better, or as advised by your healthcare provider.  · Drink lots of fluid unless you must restrict fluids for other medical reasons. This will force the medicine into your urinary system and flush the bacteria out of your body. Ask your healthcare provider how much you should drink.  · Avoid sex until you have finished all of your medicine and your symptoms are gone.  · Avoid caffeine, alcohol, and spicy foods. These foods may irritate the kidneys and bladder.  · Avoid taking bubble baths. Sensitivity to the chemicals in bubble baths can irritate the urethra.  · Make sure you wipe from front to back after using the toilet.  · Wear loose cloths and cotton underwear.  Prevention  These self-care steps can help prevent future infections:  · Drink plenty of fluids to prevent dehydration and flush out the bladder. Do this unless you must restrict fluids for other health reasons, or your healthcare provider  told you not to.  · Proper cleaning after going to the bathroom in important. Make sure you wipe from front to back after using the toilet.  · Urinate more often. Don't try to hold urine in for a long time.  · Avoid tight-fitting pants and underwear.  · Improve your diet to prevent constipation. Eat more fruits, vegetables, and fiber. Eat less junk and fatty foods. Constipation can make a urinary tract infection more likely. Talk with your healthcare provider if you have trouble with bowel movements.  · Urinate right after intercourse to flush out the bladder.  Follow-up care  Follow up with your healthcare provider, or as advised. Additional testing may be needed to make sure the infection has cleared. Close follow-up and further testing is very important to find the cause and to prevent future infections.  If a urine culture was done, you will be contacted if your treatment needs to be changed. If directed, you may call to find out the results.  If you had an X-ray, CT scan, or other diagnostic test, you will be notified of any new findings that may affect your care.  Call 911  Call 911 if any of the following occur:  · Trouble breathing  · Fainting or loss of consciousness  · Rapid or very slow heart rate  · Weakness, dizziness, or fainting  · Difficulty arousing or confusion  When to seek medical advice  Call your healthcare provider right away if any of these occur:  · Fever 100.4°F (38°C) or higher after 48 hours of treatment, or as advised by your healthcare provider  · Not feeling better within 1 to 2 days after starting antibiotics  · Any symptom that continues after 3 days of treatment  · Increasing pain in the stomach, back, side, or groin area  · Repeated vomiting  · Not able to take prescribed medicine due to nausea or another reason  · Bloody, dark-colored, or foul smelling urine  · Trouble urinating or decreased urine output  · No urine for 8 hours, no tears when crying, sunken eyes, or dry  mouth  Date Last Reviewed: 10/1/2016  © 7440-8740 [employer] [address] [place] [address], [place] [address]. All rights reserved. This information is not intended as a substitute for professional medical care. Always follow your healthcare professional's instructions.      Please follow up with your Primary care provider within 2-5 days if your signs and symptoms have not resolved or worsen.     If your condition worsens or fails to improve we recommend that you receive another evaluation at the emergency room immediately or contact your primary medical clinic to discuss your concerns.   You must understand that you have received an Urgent Care treatment only and that you may be released before all of your medical problems are known or treated. You, the patient, will arrange for follow up care as instructed.     RED FLAGS/WARNING SYMPTOMS DISCUSSED WITH PATIENT THAT WOULD WARRANT EMERGENT MEDICAL ATTENTION. PATIENT VERBALIZED UNDERSTANDING.

## 2020-03-04 ENCOUNTER — TELEPHONE (OUTPATIENT)
Dept: URGENT CARE | Facility: CLINIC | Age: 20
End: 2020-03-04

## 2020-03-04 LAB — BACTERIA UR CULT: NO GROWTH

## 2020-03-05 ENCOUNTER — TELEPHONE (OUTPATIENT)
Dept: URGENT CARE | Facility: CLINIC | Age: 20
End: 2020-03-05

## 2020-03-05 NOTE — TELEPHONE ENCOUNTER
Patient return call for results.  Negative urine culture.  Patient states she is feeling much better.

## 2020-12-18 ENCOUNTER — TELEPHONE (OUTPATIENT)
Dept: SPORTS MEDICINE | Facility: CLINIC | Age: 20
End: 2020-12-18

## 2020-12-18 DIAGNOSIS — Z03.818 ENCOUNTER FOR OBSERVATION FOR SUSPECTED EXPOSURE TO OTHER BIOLOGICAL AGENTS RULED OUT: ICD-10-CM

## 2020-12-18 DIAGNOSIS — Z20.822 EXPOSURE TO COVID-19 VIRUS: Primary | ICD-10-CM

## 2021-01-12 DIAGNOSIS — Q21.12 PFO (PATENT FORAMEN OVALE): Primary | ICD-10-CM

## 2021-01-19 ENCOUNTER — HOSPITAL ENCOUNTER (OUTPATIENT)
Dept: CARDIOLOGY | Facility: HOSPITAL | Age: 21
Discharge: HOME | End: 2021-01-19
Attending: INTERNAL MEDICINE
Payer: COMMERCIAL

## 2021-01-19 ENCOUNTER — OFFICE VISIT (OUTPATIENT)
Dept: CARDIOLOGY | Facility: CLINIC | Age: 21
End: 2021-01-19
Payer: COMMERCIAL

## 2021-01-19 VITALS
WEIGHT: 130.75 LBS | OXYGEN SATURATION: 97 % | HEIGHT: 68 IN | BODY MASS INDEX: 19.82 KG/M2 | HEIGHT: 68 IN | WEIGHT: 132 LBS | DIASTOLIC BLOOD PRESSURE: 64 MMHG | HEART RATE: 65 BPM | HEART RATE: 64 BPM | SYSTOLIC BLOOD PRESSURE: 126 MMHG | SYSTOLIC BLOOD PRESSURE: 126 MMHG | DIASTOLIC BLOOD PRESSURE: 66 MMHG | BODY MASS INDEX: 20 KG/M2

## 2021-01-19 DIAGNOSIS — Q21.12 PFO (PATENT FORAMEN OVALE): Primary | ICD-10-CM

## 2021-01-19 DIAGNOSIS — Q21.12 PFO (PATENT FORAMEN OVALE): ICD-10-CM

## 2021-01-19 DIAGNOSIS — G45.9 TIA (TRANSIENT ISCHEMIC ATTACK): ICD-10-CM

## 2021-01-19 DIAGNOSIS — G43.109 MIGRAINE WITH AURA AND WITHOUT STATUS MIGRAINOSUS, NOT INTRACTABLE: ICD-10-CM

## 2021-01-19 LAB
ASCENDING AORTA: 2.69 CM
AV INDEX (PROSTH): 0.84
AV MEAN GRADIENT: 3 MMHG
AV PEAK GRADIENT: 4 MMHG
AV VALVE AREA: 3.05 CM2
AV VELOCITY RATIO: 0.82
BSA FOR ECHO PROCEDURE: 1.69 M2
CV ECHO LV RWT: 0.34 CM
DOP CALC AO PEAK VEL: 1 M/S
DOP CALC AO VTI: 21.96 CM
DOP CALC LVOT AREA: 3.6 CM2
DOP CALC LVOT DIAMETER: 2.15 CM
DOP CALC LVOT PEAK VEL: 0.82 M/S
DOP CALC LVOT STROKE VOLUME: 66.91 CM3
DOP CALCLVOT PEAK VEL VTI: 18.44 CM
E WAVE DECELERATION TIME: 220.61 MSEC
E/A RATIO: 3
E/E' RATIO: 6.19 M/S
ECHO LV POSTERIOR WALL: 0.75 CM (ref 0.6–1.1)
FRACTIONAL SHORTENING: 36 % (ref 28–44)
INTERVENTRICULAR SEPTUM: 0.69 CM (ref 0.6–1.1)
LA MAJOR: 3.88 CM
LA MINOR: 3.6 CM
LA WIDTH: 3.14 CM
LEFT ATRIUM SIZE: 2.95 CM
LEFT ATRIUM VOLUME INDEX MOD: 21.6 ML/M2
LEFT ATRIUM VOLUME INDEX: 17.2 ML/M2
LEFT ATRIUM VOLUME MOD: 36.98 CM3
LEFT ATRIUM VOLUME: 29.41 CM3
LEFT INTERNAL DIMENSION IN SYSTOLE: 2.84 CM (ref 2.1–4)
LEFT VENTRICLE DIASTOLIC VOLUME INDEX: 52.98 ML/M2
LEFT VENTRICLE DIASTOLIC VOLUME: 90.76 ML
LEFT VENTRICLE MASS INDEX: 57 G/M2
LEFT VENTRICLE SYSTOLIC VOLUME INDEX: 17.8 ML/M2
LEFT VENTRICLE SYSTOLIC VOLUME: 30.49 ML
LEFT VENTRICULAR INTERNAL DIMENSION IN DIASTOLE: 4.46 CM (ref 3.5–6)
LEFT VENTRICULAR MASS: 97.66 G
LV LATERAL E/E' RATIO: 5.5 M/S
LV SEPTAL E/E' RATIO: 7.07 M/S
MV A" WAVE DURATION": 11.99 MSEC
MV PEAK A VEL: 0.33 M/S
MV PEAK E VEL: 0.99 M/S
PULM VEIN S/D RATIO: 1.03
PV PEAK D VEL: 0.73 M/S
PV PEAK S VEL: 0.75 M/S
RA MAJOR: 3.68 CM
RA PRESSURE: 8 MMHG
RA WIDTH: 2.71 CM
RIGHT VENTRICULAR END-DIASTOLIC DIMENSION: 2.85 CM
RV TISSUE DOPPLER FREE WALL SYSTOLIC VELOCITY 1 (APICAL 4 CHAMBER VIEW): 14.12 CM/S
SINUS: 2.53 CM
STJ: 2.66 CM
TDI LATERAL: 0.18 M/S
TDI SEPTAL: 0.14 M/S
TDI: 0.16 M/S
TRICUSPID ANNULAR PLANE SYSTOLIC EXCURSION: 1.67 CM

## 2021-01-19 PROCEDURE — 99999 PR PBB SHADOW E&M-EST. PATIENT-LVL III: CPT | Mod: PBBFAC,,, | Performed by: INTERNAL MEDICINE

## 2021-01-19 PROCEDURE — 99212 OFFICE O/P EST SF 10 MIN: CPT | Mod: S$GLB,,, | Performed by: INTERNAL MEDICINE

## 2021-01-19 PROCEDURE — 93306 ECHO (CUPID ONLY): ICD-10-PCS | Mod: 26,,, | Performed by: INTERNAL MEDICINE

## 2021-01-19 PROCEDURE — 93306 TTE W/DOPPLER COMPLETE: CPT | Mod: 26,,, | Performed by: INTERNAL MEDICINE

## 2021-01-19 PROCEDURE — 99212 PR OFFICE/OUTPT VISIT, EST, LEVL II, 10-19 MIN: ICD-10-PCS | Mod: S$GLB,,, | Performed by: INTERNAL MEDICINE

## 2021-01-19 PROCEDURE — 99999 PR PBB SHADOW E&M-EST. PATIENT-LVL III: ICD-10-PCS | Mod: PBBFAC,,, | Performed by: INTERNAL MEDICINE

## 2021-01-19 PROCEDURE — 93306 TTE W/DOPPLER COMPLETE: CPT

## 2021-01-19 RX ORDER — ASPIRIN 81 MG/1
81 TABLET ORAL DAILY
COMMUNITY

## 2021-01-20 ENCOUNTER — OFFICE VISIT (OUTPATIENT)
Dept: SPORTS MEDICINE | Facility: CLINIC | Age: 21
End: 2021-01-20
Payer: COMMERCIAL

## 2021-01-20 VITALS
WEIGHT: 130 LBS | BODY MASS INDEX: 19.7 KG/M2 | SYSTOLIC BLOOD PRESSURE: 112 MMHG | DIASTOLIC BLOOD PRESSURE: 60 MMHG | HEIGHT: 68 IN

## 2021-01-20 DIAGNOSIS — Z86.19 PERSONAL HISTORY OF OTHER INFECTIOUS AND PARASITIC DISEASES: ICD-10-CM

## 2021-01-20 DIAGNOSIS — G45.9 TIA (TRANSIENT ISCHEMIC ATTACK): ICD-10-CM

## 2021-01-20 DIAGNOSIS — Z86.16 HISTORY OF 2019 NOVEL CORONAVIRUS DISEASE (COVID-19): Primary | ICD-10-CM

## 2021-01-20 DIAGNOSIS — Q21.12 PFO (PATENT FORAMEN OVALE): ICD-10-CM

## 2021-01-20 DIAGNOSIS — Z03.818 ENCOUNTER FOR OBSERVATION FOR SUSPECTED EXPOSURE TO OTHER BIOLOGICAL AGENTS RULED OUT: ICD-10-CM

## 2021-01-20 PROCEDURE — 99204 PR OFFICE/OUTPT VISIT, NEW, LEVL IV, 45-59 MIN: ICD-10-PCS | Mod: S$GLB,,, | Performed by: ORTHOPAEDIC SURGERY

## 2021-01-20 PROCEDURE — 99999 PR PBB SHADOW E&M-EST. PATIENT-LVL II: CPT | Mod: PBBFAC,,, | Performed by: ORTHOPAEDIC SURGERY

## 2021-01-20 PROCEDURE — 99204 OFFICE O/P NEW MOD 45 MIN: CPT | Mod: S$GLB,,, | Performed by: ORTHOPAEDIC SURGERY

## 2021-01-20 PROCEDURE — 99999 PR PBB SHADOW E&M-EST. PATIENT-LVL II: ICD-10-PCS | Mod: PBBFAC,,, | Performed by: ORTHOPAEDIC SURGERY

## 2021-04-14 ENCOUNTER — TELEPHONE (OUTPATIENT)
Dept: SPORTS MEDICINE | Facility: CLINIC | Age: 21
End: 2021-04-14

## 2021-04-14 ENCOUNTER — HOSPITAL ENCOUNTER (OUTPATIENT)
Dept: CARDIOLOGY | Facility: HOSPITAL | Age: 21
Discharge: HOME | End: 2021-04-14
Attending: ORTHOPAEDIC SURGERY
Payer: COMMERCIAL

## 2021-04-14 DIAGNOSIS — R60.0 EDEMA OF LEFT LOWER LEG: ICD-10-CM

## 2021-04-14 DIAGNOSIS — R60.0 EDEMA OF LEFT LOWER LEG: Primary | ICD-10-CM

## 2021-04-14 PROCEDURE — 93971 CV US DOPPLER VENOUS LEG LEFT (CUPID ONLY): ICD-10-PCS | Mod: 26,LT,, | Performed by: INTERNAL MEDICINE

## 2021-04-14 PROCEDURE — 93971 EXTREMITY STUDY: CPT | Mod: 26,LT,, | Performed by: INTERNAL MEDICINE

## 2021-04-14 PROCEDURE — 93971 EXTREMITY STUDY: CPT | Mod: LT

## 2021-10-12 ENCOUNTER — CLINICAL SUPPORT (OUTPATIENT)
Dept: URGENT CARE | Facility: CLINIC | Age: 21
End: 2021-10-12
Payer: COMMERCIAL

## 2021-10-12 DIAGNOSIS — Z13.9 ENCOUNTER FOR SCREENING: Primary | ICD-10-CM

## 2021-10-12 LAB
CTP QC/QA: YES
SARS-COV-2 RDRP RESP QL NAA+PROBE: NEGATIVE

## 2021-10-12 PROCEDURE — U0002: ICD-10-PCS | Mod: QW,S$GLB,, | Performed by: FAMILY MEDICINE

## 2021-10-12 PROCEDURE — U0002 COVID-19 LAB TEST NON-CDC: HCPCS | Mod: QW,S$GLB,, | Performed by: FAMILY MEDICINE

## 2021-11-02 ENCOUNTER — CLINICAL SUPPORT (OUTPATIENT)
Dept: URGENT CARE | Facility: CLINIC | Age: 21
End: 2021-11-02
Payer: COMMERCIAL

## 2021-11-02 DIAGNOSIS — Z13.9 ENCOUNTER FOR SCREENING: Primary | ICD-10-CM

## 2021-11-02 LAB
CTP QC/QA: YES
SARS-COV-2 RDRP RESP QL NAA+PROBE: NEGATIVE

## 2021-11-02 PROCEDURE — U0002: ICD-10-PCS | Mod: QW,S$GLB,, | Performed by: FAMILY MEDICINE

## 2021-11-02 PROCEDURE — U0002 COVID-19 LAB TEST NON-CDC: HCPCS | Mod: QW,S$GLB,, | Performed by: FAMILY MEDICINE

## 2022-01-11 NOTE — Clinical Note
Amee's INR today is therapeutic at 2.4 on 34 mg of Warfarin weekly.  Reason for anticoagulation: atrial fibrillation  INR Range: 2.0-3.0  Last INR was subtherapeutic at 1.7 on 1/4/22 and her weekly Warfarin dose was increased by 6.2% per protocol.  This INR result is from a home INR monitor.    Patient reports no abnormal findings related to being on Warfarin. Reports no medication or dietary changes. Verified correct previous Warfarin dosing. Patient was advised to take the following Warfarin dosing: continue taking 34 mg of Warfarin weekly (4 mg on Fridays and 5 mg the other 6 days of the week).  Date of next INR will be in one week on 1/18/22 on her home monitor, staff message has been created.  No further questions/concerns.  Onsite billing provider is THIEN Potter.    Advised to contact the clinic at [phone] with the following:   Medication changes: New, dose change, advised to stop, especially antibiotics/steroids  Procedures planned: Surgical/dental/injections, or if you are advised to hold your Warfarin  Bruising/bleeding  ER visits/Urgent Care visits       The sheath is removed from the right femoral vein.

## (undated) DEVICE — SHEATH INTRODUCER 6FR 11CM

## (undated) DEVICE — SPIKE CONTRAST CONTROLLER

## (undated) DEVICE — SHEATH BRITE TIP 9F 35CM

## (undated) DEVICE — CATH MPA2 INFINITI 4FR 100CM

## (undated) DEVICE — KIT MICROINTRO 4F .018X40X7CM

## (undated) DEVICE — OMNIPAQUE 350 200ML

## (undated) DEVICE — BLLN SIZING AGA 24MM

## (undated) DEVICE — KIT CUSTOM MANIFOLD

## (undated) DEVICE — GUIDEWIRE AMPLATZ .035X260

## (undated) DEVICE — COVER DRAPE ACUSON STERILE

## (undated) DEVICE — REPROCESSED CATH ACUNAV 8FR

## (undated) DEVICE — SEE MEDLINE ITEM 156894

## (undated) DEVICE — GUIDEWIRE EMERALD 150CM PTFE

## (undated) DEVICE — DELIVERY SYSTEM
Type: IMPLANTABLE DEVICE | Site: HEART | Status: NON-FUNCTIONAL
Brand: AMPLATZER™
Removed: 2019-05-01